# Patient Record
Sex: FEMALE | Race: BLACK OR AFRICAN AMERICAN | HISPANIC OR LATINO | Employment: UNEMPLOYED | URBAN - METROPOLITAN AREA
[De-identification: names, ages, dates, MRNs, and addresses within clinical notes are randomized per-mention and may not be internally consistent; named-entity substitution may affect disease eponyms.]

---

## 2023-10-04 ENCOUNTER — ROUTINE PRENATAL (OUTPATIENT)
Dept: OBSTETRICS AND GYNECOLOGY | Facility: CLINIC | Age: 23
End: 2023-10-04
Payer: COMMERCIAL

## 2023-10-04 VITALS — SYSTOLIC BLOOD PRESSURE: 109 MMHG | DIASTOLIC BLOOD PRESSURE: 75 MMHG | BODY MASS INDEX: 24.07 KG/M2 | WEIGHT: 127.4 LBS

## 2023-10-04 DIAGNOSIS — Z3A.21 21 WEEKS GESTATION OF PREGNANCY: Primary | ICD-10-CM

## 2023-10-04 DIAGNOSIS — Z67.91 RH NEGATIVE STATUS DURING PREGNANCY IN SECOND TRIMESTER: ICD-10-CM

## 2023-10-04 DIAGNOSIS — O44.40 LOW-LYING PLACENTA: ICD-10-CM

## 2023-10-04 DIAGNOSIS — O26.892 RH NEGATIVE STATUS DURING PREGNANCY IN SECOND TRIMESTER: ICD-10-CM

## 2023-10-04 LAB
GLUCOSE UR STRIP-MCNC: NEGATIVE MG/DL
PROT UR STRIP-MCNC: NEGATIVE MG/DL

## 2023-11-02 ENCOUNTER — ROUTINE PRENATAL (OUTPATIENT)
Dept: OBSTETRICS AND GYNECOLOGY | Facility: CLINIC | Age: 23
End: 2023-11-02
Payer: COMMERCIAL

## 2023-11-02 VITALS — WEIGHT: 134.4 LBS | DIASTOLIC BLOOD PRESSURE: 60 MMHG | SYSTOLIC BLOOD PRESSURE: 106 MMHG | BODY MASS INDEX: 25.39 KG/M2

## 2023-11-02 DIAGNOSIS — N89.8 VAGINAL DISCHARGE: ICD-10-CM

## 2023-11-02 DIAGNOSIS — O26.899 RH NEGATIVE, ANTEPARTUM: ICD-10-CM

## 2023-11-02 DIAGNOSIS — Z67.91 RH NEGATIVE, ANTEPARTUM: ICD-10-CM

## 2023-11-02 DIAGNOSIS — B37.31 CANDIDAL VULVOVAGINITIS: ICD-10-CM

## 2023-11-02 DIAGNOSIS — Z11.3 SCREEN FOR SEXUALLY TRANSMITTED DISEASES: ICD-10-CM

## 2023-11-02 DIAGNOSIS — Z34.00 SUPERVISION OF NORMAL FIRST PREGNANCY, ANTEPARTUM: Primary | ICD-10-CM

## 2023-11-02 DIAGNOSIS — O44.40 LOW-LYING PLACENTA: ICD-10-CM

## 2023-11-02 NOTE — PROGRESS NOTES
Entirety of today's encounter including patient interview, exam, and counseling performed with the aid of a medical  via the telephone.     Chief complaint: Patient here for routine OB visit.    History of present illness:   Patient reports some vulvovaginal irritation.  She also ports the presence of a whitish discharge.  She says her partner has also noticed similar symptoms.  She denies dysuria.  He also denies dysuria.  Patient reports that about 2 weeks ago there have been a 2-day span where she did not feel the baby move.  She says the baby is moving normally now.  No major complaints at this time.  She denies contractions, vaginal bleeding, leakage of fluid.  She reports active fetal movement.    Objective: See vital signs in the flowsheet  General: No acute distress, awake and oriented x3  Abdomen: Soft, nontender, gravid, fetal heart tones 139  Extremities: No lower extremity edema, no calf tenderness  Psychiatric: Good judgment insight, normal affect and mood  Neurologic: Cranial nerves II through XII intact, no gross deficits    Ultrasound today:   Appropriate growth for gestational age, estimated fetal weight at the 52nd percentile, abdominal circumference at the 47th percentile  Normal amniotic fluid  There is a posterior placenta with no previa.  The placenta is no longer low-lying.  Gestational age-appropriate gross fetal movements are noted.    Assessment:  1.  23-year-old  1 at 25-6/7 weeks gestational age  2.  Low-lying placenta, now resolved  3.  Vaginal discharge, suspect vulvovaginal candidiasis  4.  Rh-    Plan:  1.  Ultrasound findings were discussed with the patient today.  Limitations of testing were explained.  2.  We discussed patient's concerns of her fetal movements.  The baby has been moving normally over the last 2 weeks.  We discussed fetal movement expectations and kick counts with the patient.  3.  Symptoms and exam findings c/w candida. Will treat  empirically with fluconazole. Also recommend probiotics. Avoid vaginal/vulvar irritants. Will send cultures for confirmation. Followup as needed.   4.  Return to the office in 2 weeks.  28-week labs at next visit.  RhoGAM at next visit.

## 2023-11-07 LAB
A VAGINAE DNA VAG QL NAA+PROBE: ABNORMAL SCORE
BVAB2 DNA VAG QL NAA+PROBE: ABNORMAL SCORE
C ALBICANS DNA VAG QL NAA+PROBE: POSITIVE
C GLABRATA DNA VAG QL NAA+PROBE: NEGATIVE
C TRACH DNA VAG QL NAA+PROBE: NEGATIVE
MEGA1 DNA VAG QL NAA+PROBE: ABNORMAL SCORE
N GONORRHOEA DNA VAG QL NAA+PROBE: NEGATIVE
T VAGINALIS DNA VAG QL NAA+PROBE: NEGATIVE

## 2023-11-13 DIAGNOSIS — Z34.00 SUPERVISION OF NORMAL FIRST PREGNANCY, ANTEPARTUM: ICD-10-CM

## 2023-11-13 DIAGNOSIS — O21.9 NAUSEA AND VOMITING DURING PREGNANCY: ICD-10-CM

## 2023-11-13 RX ORDER — DOXYLAMINE SUCCINATE AND PYRIDOXINE HYDROCHLORIDE 20; 20 MG/1; MG/1
1 TABLET, EXTENDED RELEASE ORAL 2 TIMES DAILY
Qty: 60 TABLET | Refills: 2 | Status: SHIPPED | OUTPATIENT
Start: 2023-11-13

## 2023-11-13 NOTE — TELEPHONE ENCOUNTER
Med refill. 27wk3d. OB 11/16/23. [facility], [facility]. Thank you.

## 2023-11-16 ENCOUNTER — ROUTINE PRENATAL (OUTPATIENT)
Dept: OBSTETRICS AND GYNECOLOGY | Facility: CLINIC | Age: 23
End: 2023-11-16
Payer: COMMERCIAL

## 2023-11-16 VITALS — BODY MASS INDEX: 25.81 KG/M2 | SYSTOLIC BLOOD PRESSURE: 109 MMHG | WEIGHT: 136.6 LBS | DIASTOLIC BLOOD PRESSURE: 60 MMHG

## 2023-11-16 DIAGNOSIS — Z34.00 SUPERVISION OF NORMAL FIRST PREGNANCY, ANTEPARTUM: ICD-10-CM

## 2023-11-16 DIAGNOSIS — O26.899 RH NEGATIVE, ANTEPARTUM: Primary | ICD-10-CM

## 2023-11-16 DIAGNOSIS — Z11.3 SCREEN FOR SEXUALLY TRANSMITTED DISEASES: ICD-10-CM

## 2023-11-16 DIAGNOSIS — Z67.91 RH NEGATIVE, ANTEPARTUM: Primary | ICD-10-CM

## 2023-11-16 DIAGNOSIS — Z23 NEED FOR TDAP VACCINATION: ICD-10-CM

## 2023-11-16 NOTE — PROGRESS NOTES
Entirety of today's encounter including patient interview, exam, and counseling performed with the aid of a medical  via the telephone.     Chief complaint: Patient here for routine OB visit.    History of present illness: No major complaints at this time.  She denies contractions, vaginal bleeding, leakage of fluid.  She reports active fetal movement.    Objective: See vital signs in the flowsheet  General: No acute distress, awake and oriented x3  Abdomen: Soft, nontender, gravid, fetal heart tones 135, fundal height 28 cm  Extremities: No lower extremity edema, no calf tenderness  Psychiatric: Good judgment insight, normal affect and mood  Neurologic: Cranial nerves II through XII intact, no gross deficits    Assessment:  1.  23-year-old  1 at 27 6/7 weeks gestational age noted to be Rh-    Plan:  1.  Routine 28-week labs today.  2.  RhoGAM and Tdap are recommended today.  Risk, benefits, alternatives, side effects discussed.  Patient agrees with plan for RhoGAM and Tdap today.  .  Return to office 2 weeks as  schedule.

## 2023-11-17 DIAGNOSIS — O99.019 MATERNAL ANEMIA IN PREGNANCY, ANTEPARTUM: Primary | ICD-10-CM

## 2023-11-17 LAB
BASOPHILS # BLD AUTO: 0 X10E3/UL (ref 0–0.2)
BASOPHILS NFR BLD AUTO: 0 %
EOSINOPHIL # BLD AUTO: 0.2 X10E3/UL (ref 0–0.4)
EOSINOPHIL NFR BLD AUTO: 2 %
ERYTHROCYTE [DISTWIDTH] IN BLOOD BY AUTOMATED COUNT: 12.2 % (ref 11.7–15.4)
FERRITIN SERPL-MCNC: 13 NG/ML (ref 15–150)
GLUCOSE 1H P 50 G GLC PO SERPL-MCNC: 110 MG/DL (ref 70–139)
HCT VFR BLD AUTO: 29.2 % (ref 34–46.6)
HGB BLD-MCNC: 10.1 G/DL (ref 11.1–15.9)
HIV 1+2 AB+HIV1 P24 AG SERPL QL IA: NON REACTIVE
IMM GRANULOCYTES # BLD AUTO: 0.2 X10E3/UL (ref 0–0.1)
IMM GRANULOCYTES NFR BLD AUTO: 2 %
LYMPHOCYTES # BLD AUTO: 1.9 X10E3/UL (ref 0.7–3.1)
LYMPHOCYTES NFR BLD AUTO: 18 %
MCH RBC QN AUTO: 31.9 PG (ref 26.6–33)
MCHC RBC AUTO-ENTMCNC: 34.6 G/DL (ref 31.5–35.7)
MCV RBC AUTO: 92 FL (ref 79–97)
MONOCYTES # BLD AUTO: 0.8 X10E3/UL (ref 0.1–0.9)
MONOCYTES NFR BLD AUTO: 8 %
NEUTROPHILS # BLD AUTO: 7.2 X10E3/UL (ref 1.4–7)
NEUTROPHILS NFR BLD AUTO: 70 %
PLATELET # BLD AUTO: 337 X10E3/UL (ref 150–450)
RBC # BLD AUTO: 3.17 X10E6/UL (ref 3.77–5.28)
RPR SER QL: NON REACTIVE
WBC # BLD AUTO: 10.4 X10E3/UL (ref 3.4–10.8)

## 2023-11-17 RX ORDER — FERROUS SULFATE 325(65) MG
325 TABLET ORAL EVERY OTHER DAY
Qty: 15 TABLET | Refills: 5 | Status: SHIPPED | OUTPATIENT
Start: 2023-11-17

## 2023-11-22 ENCOUNTER — TELEPHONE (OUTPATIENT)
Dept: OBSTETRICS AND GYNECOLOGY | Facility: CLINIC | Age: 23
End: 2023-11-22
Payer: COMMERCIAL

## 2023-11-22 NOTE — TELEPHONE ENCOUNTER
----- Message from Silvia Corey sent at 11/22/2023 10:08 AM EST -----  Regarding: FW: Dylonlynette brar !!  Contact: [phone]        ----- Message -----  From: Rita Leary  Sent: 11/22/2023   6:54 AM EST  To: [facility]  Subject: Leyda nietos !!                                   Recogí mis pastillas para el chuy en el [facility], karo me recomendaron no tomarlas junto con las pastillas prenatales porque es demasiado chuy, de que forma las puedo victorina ?

## 2023-11-30 ENCOUNTER — ROUTINE PRENATAL (OUTPATIENT)
Dept: OBSTETRICS AND GYNECOLOGY | Facility: CLINIC | Age: 23
End: 2023-11-30
Payer: COMMERCIAL

## 2023-11-30 VITALS — DIASTOLIC BLOOD PRESSURE: 55 MMHG | WEIGHT: 142.8 LBS | BODY MASS INDEX: 26.98 KG/M2 | SYSTOLIC BLOOD PRESSURE: 108 MMHG

## 2023-11-30 DIAGNOSIS — O21.9 NAUSEA AND VOMITING DURING PREGNANCY: ICD-10-CM

## 2023-11-30 DIAGNOSIS — O99.019 MATERNAL ANEMIA IN PREGNANCY, ANTEPARTUM: ICD-10-CM

## 2023-11-30 DIAGNOSIS — Z34.00 SUPERVISION OF NORMAL FIRST PREGNANCY, ANTEPARTUM: Primary | ICD-10-CM

## 2023-11-30 RX ORDER — PANTOPRAZOLE SODIUM 40 MG/1
40 TABLET, DELAYED RELEASE ORAL DAILY
Qty: 30 TABLET | Refills: 5 | Status: SHIPPED | OUTPATIENT
Start: 2023-11-30 | End: 2023-12-01 | Stop reason: HOSPADM

## 2023-11-30 NOTE — PROGRESS NOTES
Entirety of today's encounter including patient interview, exam, and counseling performed with the aid of a medical  via the telephone.     Chief complaint: Patient here for routine OB visit.    History of present illness:   Patient is reporting diminished appetite, nausea, and abdominal pain.  She reports the abdominal pain is generally constant in nature.  She reports it is periumbilical and also generalized throughout the abdomen.  She reports it is a mild type pain.  She says she gets relief from the pain by eating.  She says she really does not have much appetite.  She is using Bonjesta, but has not tried anything else for nausea.  She denies constipation or diarrhea.  She says she no longer needs the docusate.  She denies frequent episodes of emesis.  She denies fevers and chills.  She is otherwise doing well.   She denies contractions, vaginal bleeding, leakage of fluid.  She reports active fetal movement.    Objective: See vital signs in the flowsheet  General: No acute distress, awake and oriented x3  Abdomen: Soft, nontender, gravid, fetal heart tones 155, fundal height 30 cm  Extremities: No lower extremity edema, no calf tenderness  Psychiatric: Good judgment insight, normal affect and mood  Neurologic: Cranial nerves II through XII intact, no gross deficits      Assessment:  1.  23-year-old  1 at 29 6/7 weeks gestational age  2.  Nausea of pregnancy  3.  Diminished appetite  4.  Anemia in pregnancy, asymptomatic    Plan:  1.  Patient is reporting some generalized abdominal pain that is resolved with eating.  She is also having nausea.  Her pain really does not sound consistent with contractions.  I do not think this is a sign of  labor.  Patient may have some element of GERD/gastritis.  I would recommend starting a daily proton pump inhibitor.  Despite her concerns about diminished appetite, she has had appropriate weight gain through this pregnancy.  Reassurance is  offered.  We discussed dietary and lifestyle modifications that may also help with the symptoms.  2.  Otherwise doing well.  Return to the office in 2 weeks.  Ultrasound in 3 weeks.

## 2023-12-01 ENCOUNTER — HOSPITAL ENCOUNTER (EMERGENCY)
Facility: HOSPITAL | Age: 23
Discharge: HOME | End: 2023-12-01
Attending: OBSTETRICS & GYNECOLOGY | Admitting: OBSTETRICS & GYNECOLOGY
Payer: COMMERCIAL

## 2023-12-01 VITALS
BODY MASS INDEX: 25.2 KG/M2 | DIASTOLIC BLOOD PRESSURE: 44 MMHG | SYSTOLIC BLOOD PRESSURE: 96 MMHG | RESPIRATION RATE: 16 BRPM | HEIGHT: 63 IN | HEART RATE: 89 BPM | TEMPERATURE: 97.9 F | WEIGHT: 142.2 LBS

## 2023-12-01 LAB
BACTERIA UR QL AUTO: NORMAL /HPF
BASOPHILS # BLD AUTO: 0.04 10*3/MM3 (ref 0–0.2)
BASOPHILS NFR BLD AUTO: 0.3 % (ref 0–1.5)
BILIRUB UR QL STRIP: NEGATIVE
CLARITY UR: CLEAR
COLOR UR: YELLOW
DEPRECATED RDW RBC AUTO: 44.6 FL (ref 37–54)
EOSINOPHIL # BLD AUTO: 0.3 10*3/MM3 (ref 0–0.4)
EOSINOPHIL NFR BLD AUTO: 2.2 % (ref 0.3–6.2)
ERYTHROCYTE [DISTWIDTH] IN BLOOD BY AUTOMATED COUNT: 13 % (ref 12.3–15.4)
FETAL RBC/RBC BLD FC-RTO: 0 %
GLUCOSE UR STRIP-MCNC: NEGATIVE MG/DL
HCT VFR BLD AUTO: 26.4 % (ref 34–46.6)
HGB BLD-MCNC: 9.2 G/DL (ref 12–15.9)
HGB F BLD QL KLEIH BETKE: NORMAL
HGB UR QL STRIP.AUTO: ABNORMAL
HYALINE CASTS UR QL AUTO: NORMAL /LPF
IMM GRANULOCYTES # BLD AUTO: 0.51 10*3/MM3 (ref 0–0.05)
IMM GRANULOCYTES NFR BLD AUTO: 3.8 % (ref 0–0.5)
KETONES UR QL STRIP: NEGATIVE
LEUKOCYTE ESTERASE UR QL STRIP.AUTO: NEGATIVE
LYMPHOCYTES # BLD AUTO: 2.47 10*3/MM3 (ref 0.7–3.1)
LYMPHOCYTES NFR BLD AUTO: 18.5 % (ref 19.6–45.3)
MCH RBC QN AUTO: 32.6 PG (ref 26.6–33)
MCHC RBC AUTO-ENTMCNC: 34.8 G/DL (ref 31.5–35.7)
MCV RBC AUTO: 93.6 FL (ref 79–97)
MONOCYTES # BLD AUTO: 1.2 10*3/MM3 (ref 0.1–0.9)
MONOCYTES NFR BLD AUTO: 9 % (ref 5–12)
NEUTROPHILS NFR BLD AUTO: 66.2 % (ref 42.7–76)
NEUTROPHILS NFR BLD AUTO: 8.82 10*3/MM3 (ref 1.7–7)
NITRITE UR QL STRIP: NEGATIVE
NRBC BLD AUTO-RTO: 0 /100 WBC (ref 0–0.2)
PH UR STRIP.AUTO: 6 [PH] (ref 5–8)
PLATELET # BLD AUTO: 286 10*3/MM3 (ref 140–450)
PMV BLD AUTO: 9.5 FL (ref 6–12)
PROT UR QL STRIP: NEGATIVE
RBC # BLD AUTO: 2.82 10*6/MM3 (ref 3.77–5.28)
RBC # UR STRIP: NORMAL /HPF
REF LAB TEST METHOD: NORMAL
SP GR UR STRIP: 1.01 (ref 1–1.03)
SQUAMOUS #/AREA URNS HPF: NORMAL /HPF
UROBILINOGEN UR QL STRIP: ABNORMAL
WBC # UR STRIP: NORMAL /HPF
WBC NRBC COR # BLD AUTO: 13.34 10*3/MM3 (ref 3.4–10.8)

## 2023-12-01 PROCEDURE — 59025 FETAL NON-STRESS TEST: CPT

## 2023-12-01 PROCEDURE — 85025 COMPLETE CBC W/AUTO DIFF WBC: CPT | Performed by: OBSTETRICS & GYNECOLOGY

## 2023-12-01 PROCEDURE — 87086 URINE CULTURE/COLONY COUNT: CPT | Performed by: OBSTETRICS & GYNECOLOGY

## 2023-12-01 PROCEDURE — 99284 EMERGENCY DEPT VISIT MOD MDM: CPT | Performed by: OBSTETRICS & GYNECOLOGY

## 2023-12-01 PROCEDURE — 85460 HEMOGLOBIN FETAL: CPT | Performed by: OBSTETRICS & GYNECOLOGY

## 2023-12-01 PROCEDURE — 81001 URINALYSIS AUTO W/SCOPE: CPT | Performed by: OBSTETRICS & GYNECOLOGY

## 2023-12-01 NOTE — OBED NOTES
[facility] Note OB        Patient Name: Riat Rivera  YOB: 2000  MRN: [id number]  Admission Date: 2023  2:32 AM  Date of Service: 2023    Chief Complaint: Vaginal Bleeding (MK - VB that started around 0150 after sexual intercourse, only notices VB when wiping. +FM. Denies LOF. )        Subjective     Rita Rivera is a 23 y.o. female  at 30w0d with Estimated Date of Delivery: 24 who presents with the chief complaint listed above.  She sees Rock Etienne for her prenatal care. Her pregnancy has been complicated by: Does not speak English,  used.  Rh-, maternal anemia.  Patient reports to OB ED stating that she began noticing blood on the toilet paper following voiding after intercourse this evening about 1:50 AM.  She denies any loss of fluid otherwise.  She is not feeling contractions.  And she feels normal fetal movement              Objective   Patient Active Problem List    Diagnosis     Maternal anemia in pregnancy, antepartum [O99.019]     Rh negative, antepartum [O26.899, Z67.91]     Supervision of normal first pregnancy, antepartum [Z34.00]         OB History    Para Term  AB Living   1 0 0 0 0 0   SAB IAB Ectopic Molar Multiple Live Births   0 0 0 0 0 0      # Outcome Date GA Lbr Tarik/2nd Weight Sex Delivery Anes PTL Lv   1 Current                 History reviewed. No pertinent past medical history.    History reviewed. No pertinent surgical history.    No current facility-administered medications on file prior to encounter.     Current Outpatient Medications on File Prior to Encounter   Medication Sig Dispense Refill    ferrous sulfate 325 (65 FE) MG tablet Take 1 tablet by mouth Every Other Day. 15 tablet 5    Prenatal Vit-Fe Fumarate-FA (prenatal vitamin 28-0.8) 28-0.8 MG tablet tablet Take 1 tablet by mouth Daily.      Bonjesta 20-20 MG tablet controlled-release tablet TAKE 1 TABLET BY  "MOUTH TWICE A DAY 60 tablet 2    pantoprazole (Protonix) 40 MG EC tablet Take 1 tablet by mouth Daily. 30 tablet 5       No Known Allergies    Family History   Problem Relation Age of Onset    Breast cancer Other     Ovarian cancer Neg Hx     Uterine cancer Neg Hx     Colon cancer Neg Hx        Social History     Socioeconomic History    Marital status: Single   Tobacco Use    Smoking status: Never   Vaping Use    Vaping Use: Former   Substance and Sexual Activity    Alcohol use: Not Currently     Comment: socially    Drug use: Never    Sexual activity: Yes     Partners: Male     Birth control/protection: None           Review of Systems   Constitutional:  Negative for chills, fatigue and fever.   HENT: Negative.     Eyes:  Negative for photophobia and visual disturbance.   Respiratory:  Negative for cough, chest tightness and shortness of breath.    Cardiovascular:  Negative for chest pain and leg swelling.   Gastrointestinal:  Negative for abdominal pain, diarrhea, nausea and vomiting.   Genitourinary:  Positive for vaginal bleeding (Blood seen when voiding since intercourse at 1:50 AM). Negative for dysuria, flank pain, hematuria, pelvic pain and vaginal discharge.   Musculoskeletal:  Negative for back pain.   Neurological:  Negative for dizziness, seizures, weakness and headaches.          PHYSICAL EXAM:      VITAL SIGNS:  Vitals:    12/01/23 0256 12/01/23 0301 12/01/23 0316 12/01/23 0331   BP: 102/53 112/54 105/52 96/44   BP Location: Left arm      Patient Position: Lying      Pulse: 98 98 93 89   Resp: 16      Temp: 97.9 °F (36.6 °C)      TempSrc: Oral      Weight:       Height: 160 cm (62.99\")           FHT'S:                   Baseline:  120  BPM  Variability:  Moderate = 6 - 25 BPM  Accelerations:  15 x 15 accelerations present     Decelerations:  absent  Contractions:   absent     Interpretation:   Reactive NST, CAT 1 tracing        PHYSICAL EXAM:    General: well developed; well nourished  no acute " distress   Heart: Not performed.   Lungs: breathing is unlabored     Abdomen: soft, non-tender; no masses  no umbilical or inguinal hernias are present       Cervix: No blood seen on exam      Contractions: none        Extremities: peripheral pulses normal, no pedal edema, no clubbing or cyanosis      LABS AND TESTING ORDERED:  Uterine and fetal monitoring  Urinalysis  CBC, KB    LAB RESULTS:    Recent Results (from the past 24 hour(s))   Urinalysis With Culture If Indicated - Urine, Clean Catch    Collection Time: 12/01/23  3:03 AM    Specimen: Urine, Clean Catch   Result Value Ref Range    Color, UA Yellow Yellow, Straw    Appearance, UA Clear Clear    pH, UA 6.0 5.0 - 8.0    Specific Gravity, UA 1.007 1.005 - 1.030    Glucose, UA Negative Negative    Ketones, UA Negative Negative    Bilirubin, UA Negative Negative    Blood, UA Small (1+) (A) Negative    Protein, UA Negative Negative    Leuk Esterase, UA Negative Negative    Nitrite, UA Negative Negative    Urobilinogen, UA 0.2 E.U./dL 0.2 - 1.0 E.U./dL   Urinalysis, Microscopic Only - Urine, Clean Catch    Collection Time: 12/01/23  3:03 AM    Specimen: Urine, Clean Catch   Result Value Ref Range    RBC, UA 0-2 None Seen, 0-2 /HPF    WBC, UA 0-2 None Seen, 0-2 /HPF    Bacteria, UA None Seen None Seen /HPF    Squamous Epithelial Cells, UA 0-2 None Seen, 0-2 /HPF    Hyaline Casts, UA None Seen None Seen /LPF    Methodology Automated Microscopy    Kleihauer-Betke Stain    Collection Time: 12/01/23  3:45 AM    Specimen: Blood   Result Value Ref Range    KB Stain Result  NO FETAL CELLS SEEN NO FETAL CELLS SEEN    % Fetal Cells 0.00 <=0.00 %   CBC Auto Differential    Collection Time: 12/01/23  3:45 AM    Specimen: Blood   Result Value Ref Range    WBC 13.34 (H) 3.40 - 10.80 10*3/mm3    RBC 2.82 (L) 3.77 - 5.28 10*6/mm3    Hemoglobin 9.2 (L) 12.0 - 15.9 g/dL    Hematocrit 26.4 (L) 34.0 - 46.6 %    MCV 93.6 79.0 - 97.0 fL    MCH 32.6 26.6 - 33.0 pg    MCHC 34.8 31.5 -  "35.7 g/dL    RDW 13.0 12.3 - 15.4 %    RDW-SD 44.6 37.0 - 54.0 fl    MPV 9.5 6.0 - 12.0 fL    Platelets 286 140 - 450 10*3/mm3    Neutrophil % 66.2 42.7 - 76.0 %    Lymphocyte % 18.5 (L) 19.6 - 45.3 %    Monocyte % 9.0 5.0 - 12.0 %    Eosinophil % 2.2 0.3 - 6.2 %    Basophil % 0.3 0.0 - 1.5 %    Immature Grans % 3.8 (H) 0.0 - 0.5 %    Neutrophils, Absolute 8.82 (H) 1.70 - 7.00 10*3/mm3    Lymphocytes, Absolute 2.47 0.70 - 3.10 10*3/mm3    Monocytes, Absolute 1.20 (H) 0.10 - 0.90 10*3/mm3    Eosinophils, Absolute 0.30 0.00 - 0.40 10*3/mm3    Basophils, Absolute 0.04 0.00 - 0.20 10*3/mm3    Immature Grans, Absolute 0.51 (H) 0.00 - 0.05 10*3/mm3    nRBC 0.0 0.0 - 0.2 /100 WBC       Lab Results   Component Value Date    ABO A 2023    RH Negative 2023       No results found for: \"STREPGPB\"              Assessment & Plan   ASSESSMENT/PLAN:  Rita Rivera is a 23 y.o. female  at 30w0d .   Her pregnancy has been complicated by: Does not speak English,  used.  Rh-, maternal anemia.  Patient reports to OB ED stating that she began noticing blood on the toilet paper following voiding after intercourse this evening about 1:50 AM..  Patient was seen and evaluated.  She was noted to have no further bleeding on exam in view of being Rh- a KUB was obtained which returned negative with no fetal cells seen.  A CBC was obtained demonstrating continued anemia with an H&H of 9.2 and 26.4, her platelets remain normal at 286,000.  UA with no evidence of UTI or hematuria.  Fetus noted to have reactive NST and category 1 tracing.  Findings are most consistent with spotting following intercourse.  She is Rh- but with a negative KUB she does not require RhoGAM at this time.  In view of no evidence for continued bleeding, reassuring fetal status and no evidence of premature labor the patient will be discharged home and will follow-up with the office as scheduled    Final " "Impression:  Pregnancy at 30w0d    Reactive NST, CAT   1 tracing, Reassuring fetal status  Patient noted to have continued anemia with H&H of 9.2 and 28.4, she will continue her home meds which include iron.  Spotting just seen with voiding no evidence for urinary tract infection or for continued vaginal bleeding and no evidence for  labor, patient will follow-up with office as required  UA with no evidence of UTI, hematuria, or proteinuria.    Maternal vital signs were reviewed and were unremarkable                   Vitals:    23 0256 23 0301 23 0316 23 0331   BP: 102/53 112/54 105/52 96/44   BP Location: Left arm      Patient Position: Lying      Pulse: 98 98 93 89   Resp: 16      Temp: 97.9 °F (36.6 °C)      TempSrc: Oral      Weight:       Height: 160 cm (62.99\")            She will be discharged to home     PLAN:  Discharge to home  She will continue her home meds however she will  discontinue Bonjesta       Your medication list        ASK your doctor about these medications        Instructions Last Dose Given Next Dose Due   Bonjesta 20-20 MG tablet controlled-release tablet  Generic drug: doxylamine-pyridoxine ER      Landen 1 tableta por via oral dos veces al dai  (TAKE 1 TABLET BY MOUTH TWICE A DAY)       ferrous sulfate 325 (65 FE) MG tablet      Landen kevin tableta cada otro dai  (Take 1 tablet by mouth Every Other Day.)       pantoprazole 40 MG EC tablet  Commonly known as: Protonix      Landen 1 tableta por via oral diariamente.  (Take 1 tablet by mouth Daily.)       prenatal vitamin 28-0.8 28-0.8 MG tablet tablet      Landen 1 tableta por via oral diariamente.  (Take 1 tablet by mouth Daily.)                  She will follow up with [facility] Etienne* as scheduled and as needed  She has been instructed to return for contractions, vaginal bleeding, Rupture of membranes, decreased fetal movement or other concern  She may call the office or MK with questions.     "       I have spent 30 minutes including face to face time with the patient, greater than 50% in discussion of the diagnosis (counseling) and/or coordination of care.         Jeo Cortes MD  12/1/2023  05:41 EST  OB Hospitalist  Phone:  [phone]

## 2023-12-02 LAB — BACTERIA SPEC AEROBE CULT: NO GROWTH

## 2023-12-14 ENCOUNTER — ROUTINE PRENATAL (OUTPATIENT)
Dept: OBSTETRICS AND GYNECOLOGY | Facility: CLINIC | Age: 23
End: 2023-12-14
Payer: COMMERCIAL

## 2023-12-14 VITALS — SYSTOLIC BLOOD PRESSURE: 115 MMHG | BODY MASS INDEX: 25.16 KG/M2 | DIASTOLIC BLOOD PRESSURE: 64 MMHG | WEIGHT: 142 LBS

## 2023-12-14 DIAGNOSIS — O99.019 MATERNAL ANEMIA IN PREGNANCY, ANTEPARTUM: ICD-10-CM

## 2023-12-14 DIAGNOSIS — O26.899 RH NEGATIVE, ANTEPARTUM: ICD-10-CM

## 2023-12-14 DIAGNOSIS — Z67.91 RH NEGATIVE, ANTEPARTUM: ICD-10-CM

## 2023-12-14 DIAGNOSIS — Z34.00 SUPERVISION OF NORMAL FIRST PREGNANCY, ANTEPARTUM: Primary | ICD-10-CM

## 2023-12-14 NOTE — PROGRESS NOTES
Entirety of today's encounter including patient interview, exam, and counseling performed with the aid of a medical  via the telephone.     Chief complaint: Patient here for routine OB visit.    History of present illness:   Patient reports fatigue throughout the day.  She also did have brief episodes of mild vaginal bleeding about 1-2 weeks ago.  She went for evaluation at labor and delivery and workup was unremarkable.  She has not had any further episodes since that time.  She reports very strong fetal kicks, sometimes has a painful flare.  She is otherwise without major complaints  She denies contractions, vaginal bleeding, leakage of fluid.  She reports active fetal movement.    Objective: See vital signs in the flowsheet  General: No acute distress, awake and oriented x3  Abdomen: Soft, nontender, gravid, fetal heart tones 145, fundal height 31 cm  Extremities: No lower extremity edema, no calf tenderness  Psychiatric: Good judgment insight, normal affect and mood  Neurologic: Cranial nerves II through XII intact, no gross deficits    Assessment:  1.  23-year-old  1 at 31-6/10 weeks gestational age  2.  Rh-, status post RhoGAM  3.  Anemia of pregnancy    Plan:  1.  Reassurance offered to the patient that fatigue in the third trimester is very common.  Office related disruptions of sleep pattern.  2.  Patient is encouraged to continue oral iron.  3 return to office in 1 week.  Ultrasound for growth next week.

## 2023-12-21 ENCOUNTER — ROUTINE PRENATAL (OUTPATIENT)
Dept: OBSTETRICS AND GYNECOLOGY | Facility: CLINIC | Age: 23
End: 2023-12-21
Payer: COMMERCIAL

## 2023-12-21 VITALS — SYSTOLIC BLOOD PRESSURE: 105 MMHG | BODY MASS INDEX: 24.98 KG/M2 | WEIGHT: 141 LBS | DIASTOLIC BLOOD PRESSURE: 58 MMHG

## 2023-12-21 DIAGNOSIS — Z67.91 RH NEGATIVE, ANTEPARTUM: ICD-10-CM

## 2023-12-21 DIAGNOSIS — O99.019 MATERNAL ANEMIA IN PREGNANCY, ANTEPARTUM: ICD-10-CM

## 2023-12-21 DIAGNOSIS — O26.899 RH NEGATIVE, ANTEPARTUM: ICD-10-CM

## 2023-12-21 DIAGNOSIS — Z34.00 SUPERVISION OF NORMAL FIRST PREGNANCY, ANTEPARTUM: Primary | ICD-10-CM

## 2023-12-21 NOTE — PROGRESS NOTES
Entirety of today's encounter including patient interview, exam, and counseling performed with the aid of a medical  via the telephone.     Chief complaint: Patient here for routine OB visit.    History of present illness: No major complaints at this time.  She denies contractions, vaginal bleeding, leakage of fluid.  She reports active fetal movement.    Patient had questions about route of delivery, whether vaginal or  will be indicated.    Objective: See vital signs in the flowsheet  General: No acute distress, awake and oriented x3  Abdomen: Soft, nontender, gravid, fetal heart tones 132 bpm, fundal height 32 cm  Extremities: No lower extremity edema, no calf tenderness  Psychiatric: Good judgment insight, normal affect and mood  Neurologic: Cranial nerves II through XII intact, no gross deficits    Ultrasound today:   Appropriate growth for gestational age with the estimated fetal weight at the 40th percentile.  Abdominal circumference is at the 41st percentile.  Normal amniotic fluid  Cephalic presentation    Assessment:  1.  23-year-old  1 at 32-6/7 weeks gestational age  2.  Anemia of pregnancy, asymptomatic    Plan:  1.  Ultrasound findings from today were discussed with the patient.  Limitations of ultrasound were explained.  2.  Patient had questions about route of delivery.  Explained that currently there are no obvious indications for  section.  Explained that overall the best outcomes for both mom and baby are with a vaginal delivery.  Explained that I will be helpful with the patient will be able to have healthy uncomplicated vaginal delivery.  We explained the typical indications for  section such as reach presentation, arrest of labor, nonreassuring fetal heart tones in labor, etc.  She verbalized understanding and agrees with plans for trial of labor.

## 2024-01-12 ENCOUNTER — ROUTINE PRENATAL (OUTPATIENT)
Dept: OBSTETRICS AND GYNECOLOGY | Facility: CLINIC | Age: 24
End: 2024-01-12
Payer: COMMERCIAL

## 2024-01-12 VITALS — DIASTOLIC BLOOD PRESSURE: 59 MMHG | BODY MASS INDEX: 26.05 KG/M2 | WEIGHT: 147 LBS | SYSTOLIC BLOOD PRESSURE: 105 MMHG

## 2024-01-12 DIAGNOSIS — O99.019 MATERNAL ANEMIA IN PREGNANCY, ANTEPARTUM: ICD-10-CM

## 2024-01-12 DIAGNOSIS — O26.899 RH NEGATIVE, ANTEPARTUM: ICD-10-CM

## 2024-01-12 DIAGNOSIS — Z67.91 RH NEGATIVE, ANTEPARTUM: ICD-10-CM

## 2024-01-12 DIAGNOSIS — Z34.00 SUPERVISION OF NORMAL FIRST PREGNANCY, ANTEPARTUM: Primary | ICD-10-CM

## 2024-01-12 RX ORDER — PANTOPRAZOLE SODIUM 40 MG/1
1 TABLET, DELAYED RELEASE ORAL DAILY
COMMUNITY
Start: 2023-12-27

## 2024-01-12 NOTE — PROGRESS NOTES
Entirety of today's encounter including patient interview, exam, and counseling performed with the aid of a medical  via the telephone.     Chief complaint: Prenatal visit  History of present illness: Patient is here for her routine prenatal visit.  She is without major complaints at this time.  She denies vaginal bleeding, leakage of fluid.  She reports active fetal movement.  She does report some occasional mild contractions, but nothing regular.    Objective: See vital signs in the flowsheet  General: No acute distress, awake and oriented x3  Abdomen: Soft, nontender, gravid, fetal heart tones 140, fundal height 36 cm  Pelvic exam performed in the presence of a female chaperone.  Patient provided verbal consent to proceed with exam today.  Normal external female genitalia, no lesions  No vaginal discharge or bleeding  Cervix: Fingertip/20% effaced/-2; cephalic   Extremities: No lower extremity edema, no calf tenderness    Assessment:  1.  23-year-old  1 at 36-0/7 weeks gestational age  2.  Rh-    Plan:  1.  Labor signs discussed with the patient at length.  Also explained the patient to go to [facility] if she has the need to go to the hospital.  Address and directions to the hospital provided.  2.  GBS performed today.  3.  Return to the office in 1 week.

## 2024-01-14 LAB — GP B STREP DNA SPEC QL NAA+PROBE: POSITIVE

## 2024-01-17 ENCOUNTER — ROUTINE PRENATAL (OUTPATIENT)
Dept: OBSTETRICS AND GYNECOLOGY | Facility: CLINIC | Age: 24
End: 2024-01-17
Payer: COMMERCIAL

## 2024-01-17 VITALS — WEIGHT: 146 LBS | SYSTOLIC BLOOD PRESSURE: 109 MMHG | BODY MASS INDEX: 25.87 KG/M2 | DIASTOLIC BLOOD PRESSURE: 64 MMHG

## 2024-01-17 DIAGNOSIS — Z67.91 RH NEGATIVE, ANTEPARTUM: ICD-10-CM

## 2024-01-17 DIAGNOSIS — Z3A.36 36 WEEKS GESTATION OF PREGNANCY: Primary | ICD-10-CM

## 2024-01-17 DIAGNOSIS — O99.019 MATERNAL ANEMIA IN PREGNANCY, ANTEPARTUM: ICD-10-CM

## 2024-01-17 DIAGNOSIS — Z34.93 PRENATAL CARE IN THIRD TRIMESTER: ICD-10-CM

## 2024-01-17 DIAGNOSIS — O26.899 RH NEGATIVE, ANTEPARTUM: ICD-10-CM

## 2024-01-17 DIAGNOSIS — B95.1 POSITIVE GBS TEST: ICD-10-CM

## 2024-01-17 LAB
CLINDAMYCIN ISLT KB: NORMAL
GP B STREP DNA SPEC QL NAA+PROBE: POSITIVE
ORGANISM ID: NORMAL

## 2024-01-17 NOTE — PROGRESS NOTES
Chief Complaint   Patient presents with    Routine Prenatal Visit       OB follow up     Rita Rivera is a 23 y.o.  36w5d being seen today for her obstetrical visit.  Patient reports occasional contractions. Fetal movement: present, but decreased from normal for her. She was unable to leave a urine sample today.     Visit completed via     Review of Systems  Cramping/contractions: c/o cramping and contractions intermittently since Tuesday  Vaginal bleeding: denies  Fetal movement: present    /64   Wt 66.2 kg (146 lb)   LMP 2023   BMI 25.87 kg/m²     Assessment/Plan    Diagnoses and all orders for this visit:    1. 36 weeks gestation of pregnancy (Primary)    2. Prenatal care in third trimester    3. Positive GBS test    4. Rh negative, antepartum    5. Maternal anemia in pregnancy, antepartum       Reviewed fetal kick counts. Advised fetal movement counts BID after meals. Gross FM noted visualized on exam today  Reviewed S&S labor  GBS positive, planning antibiotics in labor  Rh negative: s/p rhogam 23  BP normal range  SVE FT/thick/high  Maternal anemia:Continue iron supplements  Reviewed this stage of pregnancy  Problem list updated     Follow up in 1 week(s) with Dr. Etienne    I spent 30 minutes caring for Rita on this date of service. This time includes time spent by me in the following activities: preparing for the visit, reviewing tests, obtaining and/or reviewing a separately obtained history, performing a medically appropriate examination and/or evaluation, counseling and educating the patient/family/caregiver, ordering medications, tests, or procedures, referring and communicating with other health care professionals, and documenting information in the medical record    Princess Lux, APRN  2024  11:59 EST

## 2024-01-26 ENCOUNTER — ROUTINE PRENATAL (OUTPATIENT)
Dept: OBSTETRICS AND GYNECOLOGY | Facility: CLINIC | Age: 24
End: 2024-01-26
Payer: COMMERCIAL

## 2024-01-26 VITALS — BODY MASS INDEX: 26.22 KG/M2 | DIASTOLIC BLOOD PRESSURE: 70 MMHG | WEIGHT: 148 LBS | SYSTOLIC BLOOD PRESSURE: 113 MMHG

## 2024-01-26 DIAGNOSIS — O99.019 MATERNAL ANEMIA IN PREGNANCY, ANTEPARTUM: ICD-10-CM

## 2024-01-26 DIAGNOSIS — Z34.00 SUPERVISION OF NORMAL FIRST PREGNANCY, ANTEPARTUM: Primary | ICD-10-CM

## 2024-01-26 DIAGNOSIS — O26.899 RH NEGATIVE, ANTEPARTUM: ICD-10-CM

## 2024-01-26 DIAGNOSIS — Z67.91 RH NEGATIVE, ANTEPARTUM: ICD-10-CM

## 2024-01-26 NOTE — PROGRESS NOTES
Entirety of today's encounter including patient interview, exam, and counseling performed with the aid of a medical  via the telephone.     Chief complaint: Prenatal visit  History of present illness: Patient is here for her routine prenatal visit.  She is without major complaints at this time.  She denies vaginal bleeding, leakage of fluid.  She reports active fetal movement.  She does report some occasional mild contractions, but nothing regular.  Patient states that she feels that the baby is very big.  She is interested in labor induction when possible.    Objective: See vital signs in the flowsheet  General: No acute distress, awake and oriented x3  Abdomen: Soft, nontender, gravid, fetal heart tones 140, fundal height 36 cm  Pelvic exam performed in the presence of a female chaperone.  Patient provided verbal consent to proceed with exam today.  Normal external female genitalia, no lesions  No vaginal discharge or bleeding  Cervix: Fingertip dilated/50% effaced/-3   Unable to ascertain presenting part on exam.  Extremities: No lower extremity edema, no calf tenderness    Labs: GBS positive    Ultrasound today:  Findings:  Live single intrauterine pregnancy in cephalic presentation  Fetal heart tones 148 bpm  Posterior placenta    Amniotic fluid index:  Quadrant 1: 4.03 cm  Quadrant 2: 1.90 cm  Quadrant 3: 4.82 cm  Quadrant 4: 3.55 cm  Total: 14.3 cm    Impression:  Cephalic presentation    Assessment:  1.  23-year-old  1 at 38-0/7 weeks gestational age  2.  Rh-  3.  GBS positive    Plan:  1.  Labor signs discussed with the patient at length.  Also explained the patient to go to [facility] if she has the need to go to the hospital.  Address and directions to the hospital provided.  2.  Was unable to discern presentation on exam.  Ultrasound today confirms cephalic presentation  3.  We have discussed with patient the options for expectant management with allowing spontaneous labor  versus labor induction at 39 weeks and beyond.  Discussed the findings of the arrive trial.  Patient strongly desires labor induction when possible.  Explained in the absence of medical indications labor induction was wait until 39 weeks gestation.  She verbalized understanding.  Patient would like to proceed with labor induction at 39 weeks of gestation.  Induction is scheduled for Sunday 2/4 at 5 PM.  Instructions given to patient but will discuss further at the time of the next visit.  4.  Return to the office in 1 week.

## 2024-01-26 NOTE — LETTER
24    SCHEDULING FORM  C-SECTIONS/INDUCTIONS    Patient:  Rita Rivera  :  2000    Phone: [phone] (home)     OB provider:  Rock Etienne MD    Summary:  23 y.o. female     Type of Delivery:  Induction   Priority:  Elective    Desired Date:        Time:      Dating   Estimated Date of Delivery: 24    Gestational age at Desired date of Induction or CS: 39 weeks 2 days  ----------------------------------------------------------------------------  By ACOG Guidelines, women should be 39 weeks or greater before initiating an elective induction (non-medically indicated) delivery     Maternal Indications:        Fetal/Placental Conditions:      ----------------------------------------------------------------------------    For patients less than 39 weeks with indications not included in the above list, [facility] consult is required prior to scheduling.    [facility] Approved indication:       Date of consult:      I attest that the above entries are accurate to the best of my knowledge:    Rock Etienne MD  2024  11:13 EST

## 2024-02-02 ENCOUNTER — PREP FOR SURGERY (OUTPATIENT)
Dept: OTHER | Facility: HOSPITAL | Age: 24
End: 2024-02-02
Payer: COMMERCIAL

## 2024-02-02 ENCOUNTER — ROUTINE PRENATAL (OUTPATIENT)
Dept: OBSTETRICS AND GYNECOLOGY | Facility: CLINIC | Age: 24
End: 2024-02-02
Payer: COMMERCIAL

## 2024-02-02 VITALS — WEIGHT: 148 LBS | DIASTOLIC BLOOD PRESSURE: 63 MMHG | SYSTOLIC BLOOD PRESSURE: 113 MMHG | BODY MASS INDEX: 26.22 KG/M2

## 2024-02-02 DIAGNOSIS — Z67.91 RH NEGATIVE, ANTEPARTUM: ICD-10-CM

## 2024-02-02 DIAGNOSIS — O26.899 RH NEGATIVE, ANTEPARTUM: ICD-10-CM

## 2024-02-02 DIAGNOSIS — O99.019 MATERNAL ANEMIA IN PREGNANCY, ANTEPARTUM: ICD-10-CM

## 2024-02-02 DIAGNOSIS — Z34.03 ENCOUNTER FOR SUPERVISION OF NORMAL FIRST PREGNANCY IN THIRD TRIMESTER: Primary | ICD-10-CM

## 2024-02-02 DIAGNOSIS — Z34.00 SUPERVISION OF NORMAL FIRST PREGNANCY, ANTEPARTUM: Primary | ICD-10-CM

## 2024-02-02 RX ORDER — SODIUM CHLORIDE 0.9 % (FLUSH) 0.9 %
10 SYRINGE (ML) INJECTION EVERY 12 HOURS SCHEDULED
Status: CANCELLED | OUTPATIENT
Start: 2024-02-02

## 2024-02-02 RX ORDER — OXYTOCIN/0.9 % SODIUM CHLORIDE 30/500 ML
250 PLASTIC BAG, INJECTION (ML) INTRAVENOUS CONTINUOUS
Status: CANCELLED | OUTPATIENT
Start: 2024-02-02 | End: 2024-02-02

## 2024-02-02 RX ORDER — LIDOCAINE HYDROCHLORIDE 10 MG/ML
0.5 INJECTION, SOLUTION INFILTRATION; PERINEURAL ONCE AS NEEDED
Status: CANCELLED | OUTPATIENT
Start: 2024-02-02

## 2024-02-02 RX ORDER — TRANEXAMIC ACID 10 MG/ML
1000 INJECTION, SOLUTION INTRAVENOUS ONCE AS NEEDED
OUTPATIENT
Start: 2024-02-02

## 2024-02-02 RX ORDER — ONDANSETRON 4 MG/1
4 TABLET, ORALLY DISINTEGRATING ORAL EVERY 6 HOURS PRN
Status: CANCELLED | OUTPATIENT
Start: 2024-02-02

## 2024-02-02 RX ORDER — SODIUM CHLORIDE 0.9 % (FLUSH) 0.9 %
10 SYRINGE (ML) INJECTION AS NEEDED
Status: CANCELLED | OUTPATIENT
Start: 2024-02-02

## 2024-02-02 RX ORDER — MORPHINE SULFATE 2 MG/ML
2 INJECTION, SOLUTION INTRAMUSCULAR; INTRAVENOUS EVERY 4 HOURS PRN
Status: CANCELLED | OUTPATIENT
Start: 2024-02-02 | End: 2024-02-07

## 2024-02-02 RX ORDER — OXYTOCIN/0.9 % SODIUM CHLORIDE 30/500 ML
999 PLASTIC BAG, INJECTION (ML) INTRAVENOUS ONCE
Status: CANCELLED | OUTPATIENT
Start: 2024-02-02 | End: 2024-02-02

## 2024-02-02 RX ORDER — MISOPROSTOL 100 MCG
25 TABLET ORAL
Status: CANCELLED | OUTPATIENT
Start: 2024-02-02 | End: 2024-02-02

## 2024-02-02 RX ORDER — PENICILLIN G 3000000 [IU]/50ML
3 INJECTION, SOLUTION INTRAVENOUS EVERY 4 HOURS
Status: CANCELLED | OUTPATIENT
Start: 2024-02-02 | End: 2024-02-05

## 2024-02-02 RX ORDER — FAMOTIDINE 20 MG/1
20 TABLET, FILM COATED ORAL EVERY 12 HOURS PRN
Status: CANCELLED | OUTPATIENT
Start: 2024-02-02

## 2024-02-02 RX ORDER — ONDANSETRON 2 MG/ML
4 INJECTION INTRAMUSCULAR; INTRAVENOUS EVERY 6 HOURS PRN
Status: CANCELLED | OUTPATIENT
Start: 2024-02-02

## 2024-02-02 RX ORDER — MAGNESIUM CARB/ALUMINUM HYDROX 105-160MG
30 TABLET,CHEWABLE ORAL ONCE
Status: CANCELLED | OUTPATIENT
Start: 2024-02-02 | End: 2024-02-02

## 2024-02-02 RX ORDER — CARBOPROST TROMETHAMINE 250 UG/ML
250 INJECTION, SOLUTION INTRAMUSCULAR AS NEEDED
Status: CANCELLED | OUTPATIENT
Start: 2024-02-02

## 2024-02-02 RX ORDER — ACETAMINOPHEN 325 MG/1
650 TABLET ORAL EVERY 4 HOURS PRN
Status: CANCELLED | OUTPATIENT
Start: 2024-02-02

## 2024-02-02 RX ORDER — TERBUTALINE SULFATE 1 MG/ML
0.25 INJECTION, SOLUTION SUBCUTANEOUS AS NEEDED
Status: CANCELLED | OUTPATIENT
Start: 2024-02-02

## 2024-02-02 RX ORDER — FAMOTIDINE 10 MG/ML
20 INJECTION, SOLUTION INTRAVENOUS EVERY 12 HOURS PRN
Status: CANCELLED | OUTPATIENT
Start: 2024-02-02

## 2024-02-02 RX ORDER — NALOXONE HCL 0.4 MG/ML
0.4 VIAL (ML) INJECTION
Status: CANCELLED | OUTPATIENT
Start: 2024-02-02

## 2024-02-02 RX ORDER — MISOPROSTOL 200 UG/1
800 TABLET ORAL AS NEEDED
Status: CANCELLED | OUTPATIENT
Start: 2024-02-02

## 2024-02-02 RX ORDER — METHYLERGONOVINE MALEATE 0.2 MG/ML
200 INJECTION INTRAVENOUS ONCE AS NEEDED
Status: CANCELLED | OUTPATIENT
Start: 2024-02-02

## 2024-02-02 RX ORDER — SODIUM CHLORIDE, SODIUM LACTATE, POTASSIUM CHLORIDE, CALCIUM CHLORIDE 600; 310; 30; 20 MG/100ML; MG/100ML; MG/100ML; MG/100ML
125 INJECTION, SOLUTION INTRAVENOUS CONTINUOUS
Status: CANCELLED | OUTPATIENT
Start: 2024-02-02

## 2024-02-04 ENCOUNTER — ANESTHESIA (OUTPATIENT)
Dept: LABOR AND DELIVERY | Facility: HOSPITAL | Age: 24
End: 2024-02-04
Payer: COMMERCIAL

## 2024-02-04 ENCOUNTER — ANESTHESIA EVENT (OUTPATIENT)
Dept: LABOR AND DELIVERY | Facility: HOSPITAL | Age: 24
End: 2024-02-04
Payer: COMMERCIAL

## 2024-02-04 ENCOUNTER — HOSPITAL ENCOUNTER (INPATIENT)
Facility: HOSPITAL | Age: 24
LOS: 3 days | Discharge: HOME | End: 2024-02-07
Attending: OBSTETRICS & GYNECOLOGY | Admitting: OBSTETRICS & GYNECOLOGY
Payer: COMMERCIAL

## 2024-02-04 ENCOUNTER — HOSPITAL ENCOUNTER (OUTPATIENT)
Dept: LABOR AND DELIVERY | Facility: HOSPITAL | Age: 24
Discharge: HOME | End: 2024-02-04
Payer: COMMERCIAL

## 2024-02-04 DIAGNOSIS — Z34.03 ENCOUNTER FOR SUPERVISION OF NORMAL FIRST PREGNANCY IN THIRD TRIMESTER: ICD-10-CM

## 2024-02-04 LAB
ABO GROUP BLD: NORMAL
ALBUMIN SERPL-MCNC: 3.7 G/DL (ref 3.5–5.2)
ALBUMIN/GLOB SERPL: 1.1 G/DL
ALP SERPL-CCNC: 225 U/L (ref 39–117)
ALT SERPL W P-5'-P-CCNC: 20 U/L (ref 1–33)
ANION GAP SERPL CALCULATED.3IONS-SCNC: 12.6 MMOL/L (ref 5–15)
AST SERPL-CCNC: 21 U/L (ref 1–32)
BASOPHILS # BLD AUTO: 0.03 10*3/MM3 (ref 0–0.2)
BASOPHILS NFR BLD AUTO: 0.3 % (ref 0–1.5)
BILIRUB SERPL-MCNC: <0.2 MG/DL (ref 0–1.2)
BLD GP AB SCN SERPL QL: POSITIVE
BUN SERPL-MCNC: 6 MG/DL (ref 6–20)
BUN/CREAT SERPL: 13.3 (ref 7–25)
CALCIUM SPEC-SCNC: 8.7 MG/DL (ref 8.6–10.5)
CHLORIDE SERPL-SCNC: 103 MMOL/L (ref 98–107)
CO2 SERPL-SCNC: 19.4 MMOL/L (ref 22–29)
CREAT SERPL-MCNC: 0.45 MG/DL (ref 0.57–1)
DEPRECATED RDW RBC AUTO: 48.4 FL (ref 37–54)
EGFRCR SERPLBLD CKD-EPI 2021: 140.6 ML/MIN/1.73
EOSINOPHIL # BLD AUTO: 0.15 10*3/MM3 (ref 0–0.4)
EOSINOPHIL NFR BLD AUTO: 1.5 % (ref 0.3–6.2)
ERYTHROCYTE [DISTWIDTH] IN BLOOD BY AUTOMATED COUNT: 14.1 % (ref 12.3–15.4)
GLOBULIN UR ELPH-MCNC: 3.3 GM/DL
GLUCOSE SERPL-MCNC: 86 MG/DL (ref 65–99)
HCT VFR BLD AUTO: 33 % (ref 34–46.6)
HGB BLD-MCNC: 11.2 G/DL (ref 12–15.9)
IMM GRANULOCYTES # BLD AUTO: 0.18 10*3/MM3 (ref 0–0.05)
IMM GRANULOCYTES NFR BLD AUTO: 1.8 % (ref 0–0.5)
LYMPHOCYTES # BLD AUTO: 2.17 10*3/MM3 (ref 0.7–3.1)
LYMPHOCYTES NFR BLD AUTO: 21.9 % (ref 19.6–45.3)
MCH RBC QN AUTO: 31.5 PG (ref 26.6–33)
MCHC RBC AUTO-ENTMCNC: 33.9 G/DL (ref 31.5–35.7)
MCV RBC AUTO: 92.7 FL (ref 79–97)
MONOCYTES # BLD AUTO: 1.11 10*3/MM3 (ref 0.1–0.9)
MONOCYTES NFR BLD AUTO: 11.2 % (ref 5–12)
NEUTROPHILS NFR BLD AUTO: 6.25 10*3/MM3 (ref 1.7–7)
NEUTROPHILS NFR BLD AUTO: 63.3 % (ref 42.7–76)
NRBC BLD AUTO-RTO: 0 /100 WBC (ref 0–0.2)
PLATELET # BLD AUTO: 281 10*3/MM3 (ref 140–450)
PMV BLD AUTO: 10.8 FL (ref 6–12)
POTASSIUM SERPL-SCNC: 3.9 MMOL/L (ref 3.5–5.2)
PROT SERPL-MCNC: 7 G/DL (ref 6–8.5)
RBC # BLD AUTO: 3.56 10*6/MM3 (ref 3.77–5.28)
RESIDUAL RHIG DETECTED: NORMAL
RH BLD: NEGATIVE
SODIUM SERPL-SCNC: 135 MMOL/L (ref 136–145)
T PALLIDUM IGG SER QL: NORMAL
T&S EXPIRATION DATE: NORMAL
WBC NRBC COR # BLD AUTO: 9.89 10*3/MM3 (ref 3.4–10.8)

## 2024-02-04 PROCEDURE — 86870 RBC ANTIBODY IDENTIFICATION: CPT | Performed by: OBSTETRICS & GYNECOLOGY

## 2024-02-04 PROCEDURE — 86780 TREPONEMA PALLIDUM: CPT | Performed by: OBSTETRICS & GYNECOLOGY

## 2024-02-04 PROCEDURE — 86850 RBC ANTIBODY SCREEN: CPT | Performed by: OBSTETRICS & GYNECOLOGY

## 2024-02-04 PROCEDURE — 86901 BLOOD TYPING SEROLOGIC RH(D): CPT | Performed by: OBSTETRICS & GYNECOLOGY

## 2024-02-04 PROCEDURE — 80053 COMPREHEN METABOLIC PANEL: CPT | Performed by: OBSTETRICS & GYNECOLOGY

## 2024-02-04 PROCEDURE — 86900 BLOOD TYPING SEROLOGIC ABO: CPT | Performed by: OBSTETRICS & GYNECOLOGY

## 2024-02-04 PROCEDURE — 85025 COMPLETE CBC W/AUTO DIFF WBC: CPT | Performed by: OBSTETRICS & GYNECOLOGY

## 2024-02-04 RX ORDER — FAMOTIDINE 20 MG/1
20 TABLET, FILM COATED ORAL EVERY 12 HOURS PRN
Status: DISCONTINUED | OUTPATIENT
Start: 2024-02-04 | End: 2024-02-05 | Stop reason: HOSPADM

## 2024-02-04 RX ORDER — NALOXONE HCL 0.4 MG/ML
0.4 VIAL (ML) INJECTION
Status: DISCONTINUED | OUTPATIENT
Start: 2024-02-04 | End: 2024-02-05 | Stop reason: HOSPADM

## 2024-02-04 RX ORDER — FAMOTIDINE 10 MG/ML
20 INJECTION, SOLUTION INTRAVENOUS EVERY 12 HOURS PRN
Status: DISCONTINUED | OUTPATIENT
Start: 2024-02-04 | End: 2024-02-05 | Stop reason: HOSPADM

## 2024-02-04 RX ORDER — MORPHINE SULFATE 2 MG/ML
2 INJECTION, SOLUTION INTRAMUSCULAR; INTRAVENOUS EVERY 4 HOURS PRN
Status: DISCONTINUED | OUTPATIENT
Start: 2024-02-04 | End: 2024-02-05 | Stop reason: HOSPADM

## 2024-02-04 RX ORDER — ONDANSETRON 4 MG/1
4 TABLET, ORALLY DISINTEGRATING ORAL EVERY 6 HOURS PRN
Status: DISCONTINUED | OUTPATIENT
Start: 2024-02-04 | End: 2024-02-05 | Stop reason: HOSPADM

## 2024-02-04 RX ORDER — CITRIC ACID/SODIUM CITRATE 334-500MG
30 SOLUTION, ORAL ORAL ONCE
Status: DISCONTINUED | OUTPATIENT
Start: 2024-02-04 | End: 2024-02-05 | Stop reason: HOSPADM

## 2024-02-04 RX ORDER — ONDANSETRON 2 MG/ML
4 INJECTION INTRAMUSCULAR; INTRAVENOUS EVERY 6 HOURS PRN
Status: DISCONTINUED | OUTPATIENT
Start: 2024-02-04 | End: 2024-02-05 | Stop reason: HOSPADM

## 2024-02-04 RX ORDER — MAGNESIUM CARB/ALUMINUM HYDROX 105-160MG
30 TABLET,CHEWABLE ORAL ONCE
Status: DISCONTINUED | OUTPATIENT
Start: 2024-02-04 | End: 2024-02-05 | Stop reason: HOSPADM

## 2024-02-04 RX ORDER — ONDANSETRON 2 MG/ML
4 INJECTION INTRAMUSCULAR; INTRAVENOUS ONCE AS NEEDED
Status: DISCONTINUED | OUTPATIENT
Start: 2024-02-04 | End: 2024-02-05 | Stop reason: HOSPADM

## 2024-02-04 RX ORDER — FENTANYL CIT 0.2 MG/100ML-ROPIV 0.2%-NACL 0.9% EPIDURAL INJ 2/0.2 MCG/ML-%
8 SOLUTION INJECTION CONTINUOUS
Status: DISCONTINUED | OUTPATIENT
Start: 2024-02-04 | End: 2024-02-05

## 2024-02-04 RX ORDER — LIDOCAINE HYDROCHLORIDE 10 MG/ML
0.5 INJECTION, SOLUTION INFILTRATION; PERINEURAL ONCE AS NEEDED
Status: DISCONTINUED | OUTPATIENT
Start: 2024-02-04 | End: 2024-02-05 | Stop reason: HOSPADM

## 2024-02-04 RX ORDER — EPHEDRINE SULFATE 50 MG/ML
5 INJECTION, SOLUTION INTRAVENOUS
Status: DISCONTINUED | OUTPATIENT
Start: 2024-02-04 | End: 2024-02-05 | Stop reason: HOSPADM

## 2024-02-04 RX ORDER — ACETAMINOPHEN 325 MG/1
650 TABLET ORAL EVERY 4 HOURS PRN
Status: DISCONTINUED | OUTPATIENT
Start: 2024-02-04 | End: 2024-02-05 | Stop reason: HOSPADM

## 2024-02-04 RX ORDER — FAMOTIDINE 10 MG/ML
20 INJECTION, SOLUTION INTRAVENOUS ONCE AS NEEDED
Status: DISCONTINUED | OUTPATIENT
Start: 2024-02-04 | End: 2024-02-05 | Stop reason: HOSPADM

## 2024-02-04 RX ORDER — SODIUM CHLORIDE 0.9 % (FLUSH) 0.9 %
10 SYRINGE (ML) INJECTION AS NEEDED
Status: DISCONTINUED | OUTPATIENT
Start: 2024-02-04 | End: 2024-02-05 | Stop reason: HOSPADM

## 2024-02-04 RX ORDER — PENICILLIN G 3000000 [IU]/50ML
3 INJECTION, SOLUTION INTRAVENOUS EVERY 4 HOURS
Status: DISCONTINUED | OUTPATIENT
Start: 2024-02-05 | End: 2024-02-05 | Stop reason: HOSPADM

## 2024-02-04 RX ORDER — MISOPROSTOL 100 MCG
25 TABLET ORAL
Status: DISCONTINUED | OUTPATIENT
Start: 2024-02-04 | End: 2024-02-05

## 2024-02-04 RX ORDER — TERBUTALINE SULFATE 1 MG/ML
0.25 INJECTION, SOLUTION SUBCUTANEOUS AS NEEDED
Status: DISCONTINUED | OUTPATIENT
Start: 2024-02-04 | End: 2024-02-05 | Stop reason: HOSPADM

## 2024-02-04 RX ORDER — SODIUM CHLORIDE 0.9 % (FLUSH) 0.9 %
10 SYRINGE (ML) INJECTION EVERY 12 HOURS SCHEDULED
Status: DISCONTINUED | OUTPATIENT
Start: 2024-02-04 | End: 2024-02-05 | Stop reason: HOSPADM

## 2024-02-04 RX ORDER — SODIUM CHLORIDE, SODIUM LACTATE, POTASSIUM CHLORIDE, CALCIUM CHLORIDE 600; 310; 30; 20 MG/100ML; MG/100ML; MG/100ML; MG/100ML
125 INJECTION, SOLUTION INTRAVENOUS CONTINUOUS
Status: DISCONTINUED | OUTPATIENT
Start: 2024-02-04 | End: 2024-02-05

## 2024-02-04 RX ADMIN — Medication 25 MCG: at 21:45

## 2024-02-05 LAB
ABO GROUP BLD: NORMAL
ATMOSPHERIC PRESS: 748.8 MMHG
BASE EXCESS BLDCOV CALC-SCNC: -5.5 MMOL/L (ref -30–30)
BDY SITE: ABNORMAL
CO2 BLDA-SCNC: 24 MMOL/L (ref 23–27)
DEVICE COMMENT: ABNORMAL
FETAL BLEED: NEGATIVE
HCO3 BLDCOV-SCNC: 22.4 MMOL/L
MODALITY: ABNORMAL
NUMBER OF DOSES: NORMAL
PCO2 BLDCOV: 51.5 MM HG (ref 35–51.3)
PH BLDCOV: 7.25 PH UNITS (ref 7.26–7.4)
PO2 BLDCOV: <18.1 MM HG (ref 19–39)
RH BLD: NEGATIVE
SAO2 % BLDCOV: ABNORMAL %

## 2024-02-05 PROCEDURE — 25010000002 TERBUTALINE PER 1 MG: Performed by: OBSTETRICS & GYNECOLOGY

## 2024-02-05 PROCEDURE — 85461 HEMOGLOBIN FETAL: CPT | Performed by: OBSTETRICS & GYNECOLOGY

## 2024-02-05 PROCEDURE — 25010000002 PENICILLIN G POTASSIUM PER 600000 UNITS: Performed by: OBSTETRICS & GYNECOLOGY

## 2024-02-05 PROCEDURE — 59410 OBSTETRICAL CARE: CPT | Performed by: OBSTETRICS & GYNECOLOGY

## 2024-02-05 PROCEDURE — 82803 BLOOD GASES ANY COMBINATION: CPT

## 2024-02-05 PROCEDURE — 25010000002 ROPIVACAINE PER 1 MG: Performed by: ANESTHESIOLOGY

## 2024-02-05 PROCEDURE — C1755 CATHETER, INTRASPINAL: HCPCS | Performed by: ANESTHESIOLOGY

## 2024-02-05 PROCEDURE — 25010000002 MORPHINE PER 10 MG: Performed by: OBSTETRICS & GYNECOLOGY

## 2024-02-05 PROCEDURE — 86901 BLOOD TYPING SEROLOGIC RH(D): CPT | Performed by: OBSTETRICS & GYNECOLOGY

## 2024-02-05 PROCEDURE — 86900 BLOOD TYPING SEROLOGIC ABO: CPT | Performed by: OBSTETRICS & GYNECOLOGY

## 2024-02-05 PROCEDURE — 0HQ9XZZ REPAIR PERINEUM SKIN, EXTERNAL APPROACH: ICD-10-PCS | Performed by: OBSTETRICS & GYNECOLOGY

## 2024-02-05 PROCEDURE — 25810000003 LACTATED RINGERS PER 1000 ML: Performed by: OBSTETRICS & GYNECOLOGY

## 2024-02-05 PROCEDURE — 25010000002 ONDANSETRON PER 1 MG: Performed by: OBSTETRICS & GYNECOLOGY

## 2024-02-05 RX ORDER — KETOROLAC TROMETHAMINE 30 MG/ML
30 INJECTION, SOLUTION INTRAMUSCULAR; INTRAVENOUS ONCE
OUTPATIENT
Start: 2024-02-05 | End: 2024-02-05

## 2024-02-05 RX ORDER — ONDANSETRON 4 MG/1
4 TABLET, ORALLY DISINTEGRATING ORAL EVERY 6 HOURS PRN
Status: DISCONTINUED | OUTPATIENT
Start: 2024-02-05 | End: 2024-02-05 | Stop reason: HOSPADM

## 2024-02-05 RX ORDER — CARBOPROST TROMETHAMINE 250 UG/ML
250 INJECTION, SOLUTION INTRAMUSCULAR AS NEEDED
Status: DISCONTINUED | OUTPATIENT
Start: 2024-02-05 | End: 2024-02-05 | Stop reason: HOSPADM

## 2024-02-05 RX ORDER — METHYLERGONOVINE MALEATE 0.2 MG/ML
200 INJECTION INTRAVENOUS ONCE AS NEEDED
Status: DISCONTINUED | OUTPATIENT
Start: 2024-02-05 | End: 2024-02-07 | Stop reason: HOSPADM

## 2024-02-05 RX ORDER — OXYTOCIN/0.9 % SODIUM CHLORIDE 30/500 ML
125 PLASTIC BAG, INJECTION (ML) INTRAVENOUS CONTINUOUS PRN
Status: COMPLETED | OUTPATIENT
Start: 2024-02-05 | End: 2024-02-05

## 2024-02-05 RX ORDER — ACETAMINOPHEN 325 MG/1
650 TABLET ORAL EVERY 4 HOURS PRN
Status: DISCONTINUED | OUTPATIENT
Start: 2024-02-05 | End: 2024-02-05 | Stop reason: HOSPADM

## 2024-02-05 RX ORDER — IBUPROFEN 600 MG/1
600 TABLET ORAL EVERY 6 HOURS PRN
Status: DISCONTINUED | OUTPATIENT
Start: 2024-02-05 | End: 2024-02-07 | Stop reason: HOSPADM

## 2024-02-05 RX ORDER — OXYTOCIN/0.9 % SODIUM CHLORIDE 30/500 ML
250 PLASTIC BAG, INJECTION (ML) INTRAVENOUS CONTINUOUS
OUTPATIENT
Start: 2024-02-05 | End: 2024-02-05

## 2024-02-05 RX ORDER — ACETAMINOPHEN 325 MG/1
650 TABLET ORAL EVERY 6 HOURS PRN
Status: DISCONTINUED | OUTPATIENT
Start: 2024-02-05 | End: 2024-02-07 | Stop reason: HOSPADM

## 2024-02-05 RX ORDER — SODIUM CHLORIDE 9 MG/ML
125 INJECTION, SOLUTION INTRAVENOUS CONTINUOUS
Status: DISCONTINUED | OUTPATIENT
Start: 2024-02-05 | End: 2024-02-05

## 2024-02-05 RX ORDER — HYDROCORTISONE 25 MG/G
CREAM TOPICAL AS NEEDED
Status: DISCONTINUED | OUTPATIENT
Start: 2024-02-05 | End: 2024-02-07 | Stop reason: HOSPADM

## 2024-02-05 RX ORDER — CARBOPROST TROMETHAMINE 250 UG/ML
250 INJECTION, SOLUTION INTRAMUSCULAR ONCE AS NEEDED
Status: DISCONTINUED | OUTPATIENT
Start: 2024-02-05 | End: 2024-02-07 | Stop reason: HOSPADM

## 2024-02-05 RX ORDER — OXYTOCIN/0.9 % SODIUM CHLORIDE 30/500 ML
125 PLASTIC BAG, INJECTION (ML) INTRAVENOUS CONTINUOUS PRN
Status: DISCONTINUED | OUTPATIENT
Start: 2024-02-05 | End: 2024-02-07 | Stop reason: HOSPADM

## 2024-02-05 RX ORDER — CEFAZOLIN SODIUM 2 G/100ML
2000 INJECTION, SOLUTION INTRAVENOUS ONCE
Status: DISCONTINUED | OUTPATIENT
Start: 2024-02-05 | End: 2024-02-05

## 2024-02-05 RX ORDER — MISOPROSTOL 200 UG/1
600 TABLET ORAL ONCE AS NEEDED
Status: DISCONTINUED | OUTPATIENT
Start: 2024-02-05 | End: 2024-02-07 | Stop reason: HOSPADM

## 2024-02-05 RX ORDER — OXYTOCIN/0.9 % SODIUM CHLORIDE 30/500 ML
999 PLASTIC BAG, INJECTION (ML) INTRAVENOUS ONCE
Status: COMPLETED | OUTPATIENT
Start: 2024-02-05 | End: 2024-02-05

## 2024-02-05 RX ORDER — TRAMADOL HYDROCHLORIDE 50 MG/1
50 TABLET ORAL EVERY 6 HOURS PRN
Status: DISCONTINUED | OUTPATIENT
Start: 2024-02-05 | End: 2024-02-07 | Stop reason: HOSPADM

## 2024-02-05 RX ORDER — MISOPROSTOL 200 UG/1
800 TABLET ORAL AS NEEDED
Status: DISCONTINUED | OUTPATIENT
Start: 2024-02-05 | End: 2024-02-05 | Stop reason: HOSPADM

## 2024-02-05 RX ORDER — ROPIVACAINE HYDROCHLORIDE 2 MG/ML
INJECTION, SOLUTION EPIDURAL; INFILTRATION; PERINEURAL AS NEEDED
Status: DISCONTINUED | OUTPATIENT
Start: 2024-02-05 | End: 2024-02-05 | Stop reason: SURG

## 2024-02-05 RX ORDER — OXYTOCIN/0.9 % SODIUM CHLORIDE 30/500 ML
250 PLASTIC BAG, INJECTION (ML) INTRAVENOUS CONTINUOUS
Status: ACTIVE | OUTPATIENT
Start: 2024-02-05 | End: 2024-02-05

## 2024-02-05 RX ORDER — ACETAMINOPHEN 500 MG
1000 TABLET ORAL ONCE
Status: DISCONTINUED | OUTPATIENT
Start: 2024-02-05 | End: 2024-02-05 | Stop reason: HOSPADM

## 2024-02-05 RX ORDER — METHYLERGONOVINE MALEATE 0.2 MG/ML
200 INJECTION INTRAVENOUS ONCE AS NEEDED
Status: DISCONTINUED | OUTPATIENT
Start: 2024-02-05 | End: 2024-02-05 | Stop reason: HOSPADM

## 2024-02-05 RX ORDER — FAMOTIDINE 10 MG/ML
20 INJECTION, SOLUTION INTRAVENOUS ONCE AS NEEDED
Status: DISCONTINUED | OUTPATIENT
Start: 2024-02-05 | End: 2024-02-05 | Stop reason: HOSPADM

## 2024-02-05 RX ORDER — PRENATAL VIT/IRON FUM/FOLIC AC 27MG-0.8MG
1 TABLET ORAL DAILY
Status: DISCONTINUED | OUTPATIENT
Start: 2024-02-06 | End: 2024-02-07 | Stop reason: HOSPADM

## 2024-02-05 RX ORDER — ONDANSETRON 2 MG/ML
4 INJECTION INTRAMUSCULAR; INTRAVENOUS EVERY 6 HOURS PRN
Status: DISCONTINUED | OUTPATIENT
Start: 2024-02-05 | End: 2024-02-07 | Stop reason: HOSPADM

## 2024-02-05 RX ORDER — CALCIUM CARBONATE 500 MG/1
2 TABLET, CHEWABLE ORAL 3 TIMES DAILY PRN
Status: DISCONTINUED | OUTPATIENT
Start: 2024-02-05 | End: 2024-02-07 | Stop reason: HOSPADM

## 2024-02-05 RX ORDER — SODIUM CHLORIDE 0.9 % (FLUSH) 0.9 %
1-10 SYRINGE (ML) INJECTION AS NEEDED
Status: DISCONTINUED | OUTPATIENT
Start: 2024-02-05 | End: 2024-02-07 | Stop reason: HOSPADM

## 2024-02-05 RX ORDER — DOCUSATE SODIUM 100 MG/1
100 CAPSULE, LIQUID FILLED ORAL 2 TIMES DAILY
Status: DISCONTINUED | OUTPATIENT
Start: 2024-02-06 | End: 2024-02-07 | Stop reason: HOSPADM

## 2024-02-05 RX ORDER — ONDANSETRON 2 MG/ML
4 INJECTION INTRAMUSCULAR; INTRAVENOUS EVERY 6 HOURS PRN
Status: DISCONTINUED | OUTPATIENT
Start: 2024-02-05 | End: 2024-02-05 | Stop reason: HOSPADM

## 2024-02-05 RX ORDER — OXYTOCIN/0.9 % SODIUM CHLORIDE 30/500 ML
999 PLASTIC BAG, INJECTION (ML) INTRAVENOUS ONCE
OUTPATIENT
Start: 2024-02-05 | End: 2024-02-05

## 2024-02-05 RX ORDER — BISACODYL 10 MG
10 SUPPOSITORY, RECTAL RECTAL DAILY PRN
Status: DISCONTINUED | OUTPATIENT
Start: 2024-02-06 | End: 2024-02-07 | Stop reason: HOSPADM

## 2024-02-05 RX ADMIN — SODIUM CHLORIDE, POTASSIUM CHLORIDE, SODIUM LACTATE AND CALCIUM CHLORIDE 125 ML/HR: 600; 310; 30; 20 INJECTION, SOLUTION INTRAVENOUS at 06:45

## 2024-02-05 RX ADMIN — ROPIVACAINE HYDROCHLORIDE 5 ML: 2 INJECTION, SOLUTION EPIDURAL; INFILTRATION at 08:10

## 2024-02-05 RX ADMIN — SODIUM CHLORIDE, POTASSIUM CHLORIDE, SODIUM LACTATE AND CALCIUM CHLORIDE 125 ML/HR: 600; 310; 30; 20 INJECTION, SOLUTION INTRAVENOUS at 17:15

## 2024-02-05 RX ADMIN — SODIUM CHLORIDE 5 MILLION UNITS: 9 INJECTION, SOLUTION INTRAVENOUS at 09:55

## 2024-02-05 RX ADMIN — Medication 8 ML/HR: at 17:35

## 2024-02-05 RX ADMIN — Medication 125 ML/HR: at 20:59

## 2024-02-05 RX ADMIN — Medication 10 ML/HR: at 08:16

## 2024-02-05 RX ADMIN — TERBUTALINE SULFATE 0.25 MG: 1 INJECTION, SOLUTION SUBCUTANEOUS at 09:11

## 2024-02-05 RX ADMIN — PENICILLIN G 3 MILLION UNITS: 3000000 INJECTION, SOLUTION INTRAVENOUS at 14:20

## 2024-02-05 RX ADMIN — MISOPROSTOL 400 MCG: 200 TABLET ORAL at 19:45

## 2024-02-05 RX ADMIN — MORPHINE SULFATE 2 MG: 2 INJECTION, SOLUTION INTRAMUSCULAR; INTRAVENOUS at 06:46

## 2024-02-05 RX ADMIN — ONDANSETRON 4 MG: 2 INJECTION INTRAMUSCULAR; INTRAVENOUS at 14:44

## 2024-02-05 RX ADMIN — ROPIVACAINE HYDROCHLORIDE 4 ML: 2 INJECTION, SOLUTION EPIDURAL; INFILTRATION at 08:12

## 2024-02-05 RX ADMIN — Medication: at 23:57

## 2024-02-05 RX ADMIN — Medication 25 MCG: at 02:06

## 2024-02-05 RX ADMIN — ROPIVACAINE HYDROCHLORIDE 4 ML: 2 INJECTION, SOLUTION EPIDURAL; INFILTRATION at 08:14

## 2024-02-05 RX ADMIN — SODIUM CHLORIDE, POTASSIUM CHLORIDE, SODIUM LACTATE AND CALCIUM CHLORIDE 125 ML/HR: 600; 310; 30; 20 INJECTION, SOLUTION INTRAVENOUS at 10:00

## 2024-02-05 RX ADMIN — DOCUSATE SODIUM 100 MG: 100 CAPSULE, LIQUID FILLED ORAL at 23:57

## 2024-02-05 RX ADMIN — IBUPROFEN 600 MG: 600 TABLET, FILM COATED ORAL at 23:57

## 2024-02-05 RX ADMIN — SODIUM CHLORIDE, POTASSIUM CHLORIDE, SODIUM LACTATE AND CALCIUM CHLORIDE 125 ML/HR: 600; 310; 30; 20 INJECTION, SOLUTION INTRAVENOUS at 09:00

## 2024-02-05 RX ADMIN — Medication 250 ML/HR: at 19:56

## 2024-02-05 RX ADMIN — Medication 999 ML/HR: at 19:41

## 2024-02-05 NOTE — PLAN OF CARE
Goal Outcome Evaluation:              Outcome Evaluation: New admit IOL

## 2024-02-05 NOTE — NURSING NOTE
Called Dr Etienne and notified him that pt just had another prolonged deceleration and terbutaline was given. Fhr has currently recovered. Md is in the building and available as needed.

## 2024-02-05 NOTE — NURSING NOTE
I wrote a nursing note under riley SKY RN in error. This RN called dr. Etienne @ 0508 to notifiy him of prolonged deceleration, pt has been turned, given terbutaline. MD is in hospital and will come see pt again. erg

## 2024-02-05 NOTE — PROGRESS NOTES
Subjective: Patient comfortable.    O:  Vitals:    24 0823 24 0827 24 1100 24 1130   BP: 127/77 120/67 108/53 103/51   Pulse: 77 72 93 86   Resp:       Temp:       TempSrc:       SpO2:  100%     Weight:       Height:         Pelvic exam performed in the presence of a female RN chaperone.  Patient's partner was also present for pelvic exam.  Patient provided verbal consent to proceed with pelvic exam.        Cervix: 4 cm dilated/90% effaced/-1  Artificial rupture membranes performed with clear fluid noted.  IUPC placed and working well.    NST: 130/reactive/moderate variability/no decelerations  Comments: Contractions about every 2-3 minutes    Assessment:  1.  23-year-old  1 at 39-3/7 weeks gestational age  2.  GBS positive  3.  Fetal heart tones currently category 1    Plan:  1.  Continue with trial of labor.  Monitor contraction pattern now that IUPC is in place.  Can augment with Pitocin if necessary.  Titrate to achieve a goal of 200-220 Howes units.    Patient had some variable and occasionally prolonged decelerations earlier.  If these were to recur could start amnioinfusion through IUPC.  Continue penicillin.

## 2024-02-05 NOTE — ANESTHESIA PREPROCEDURE EVALUATION
Anesthesia Evaluation     Patient summary reviewed and Nursing notes reviewed   NPO Solid Status: > 8 hours             Airway   Mallampati: II  TM distance: >3 FB  Neck ROM: full  no difficulty expected  Dental - normal exam     Pulmonary - negative pulmonary ROS and normal exam   Cardiovascular - negative cardio ROS and normal exam        Neuro/Psych- negative ROS  GI/Hepatic/Renal/Endo - negative ROS     Musculoskeletal (-) negative ROS    Abdominal  - normal exam   Substance History - negative use     OB/GYN    (+) Pregnant        Other - negative ROS                   Anesthesia Plan    ASA 2     epidural     (  39w3d  )    Anesthetic plan, risks, benefits, and alternatives have been provided, discussed and informed consent has been obtained with: patient.    CODE STATUS:    Code Status (Patient has no pulse and is not breathing): CPR (Attempt to Resuscitate)  Medical Interventions (Patient has pulse or is breathing): Full Support

## 2024-02-05 NOTE — ANESTHESIA PROCEDURE NOTES
Labor Epidural      Patient location during procedure: OB  Start Time: 2/5/2024 8:05 AM  Stop Time: 2/5/2024 8:16 AM  Indication:at surgeon's request  Performed By  Anesthesiologist: Walter Heck MD  Preanesthetic Checklist  Completed: patient identified, IV checked, site marked, risks and benefits discussed, surgical consent, monitors and equipment checked, pre-op evaluation and timeout performed  Prep:  Pt Position:sitting  Sterile Tech:cap, gloves and mask  Prep:povidone-iodine 7.5% surgical scrub  Monitoring:blood pressure monitoring, continuous pulse oximetry and EKG  Epidural Block Procedure:  Approach:midline  Guidance:landmark technique  Location:L4-L5  Needle Type:Tuohy  Needle Gauge:17 and 17 G  Loss of Resistance Medium: air  Loss of Resistance: 7cm  Cath Depth at skin:10 cm  Paresthesia: none  Aspiration:negative  Test Dose:negative  Number of Attempts: 1  Post Assessment:  Dressing:occlusive dressing applied and secured with tape  Pt Tolerance:patient tolerated the procedure well with no apparent complications  Complications:no

## 2024-02-05 NOTE — H&P
[facility]   HISTORY AND PHYSICAL    Patient Name: Rita Rivera  : 2000  MRN: [id number]  Primary Care Physician:  Aylin Kuhn APRN  Date of admission: 2024    Subjective   Subjective     Chief Complaint: Labor induction    HPI:    Rita Rivera is a 23 y.o. female presented last night for labor induction for term 39+ weeks.  This pregnancy has been largely uncomplicated.  She is in her usual state of health at this time.    Review of Systems    Constitutional: No fevers, chills, sweats   Eye: No recent visual problems, denies blurry vision   HEENT: No ear pain, nasal congestion, sore throat, voice changes   Respiratory: No shortness of breath, cough, pain on breathing   Cardiovascular: No Chest pain, palpitations   Gastrointestinal: No nausea, vomiting, diarrhea, constipation   Genitourinary: No hematuria, dysuria, lesions on genitalia   Hema/Lymph: Negative for bruising, no edema   Endocrine: Negative for excessive thirst, excessive hunger, heat or cold intolerance   Musculoskeletal: No joint pain, muscle pain, decreased range of motion   Integumentary: No rash, pruritus, abrasions, lesions   Neurologic: No weakness, numbness, headaches   Psychiatric: No anxiety, depression, mood changes         Personal History     History reviewed. No pertinent past medical history.    History reviewed. No pertinent surgical history.    Family History: family history includes Breast cancer in an other family member; No Known Problems in her mother. Otherwise pertinent FHx was reviewed and not pertinent to current issue.    Social History:  reports that she has never smoked. She does not have any smokeless tobacco history on file. She reports that she does not currently use alcohol. She reports that she does not use drugs.    Home Medications:  Doxylamine-Pyridoxine, ferrous sulfate, pantoprazole, and prenatal vitamin 28-0.8      Allergies:  No Known  Allergies    Objective   Objective     Vitals:   Temp:  [97.9 °F (36.6 °C)-98.7 °F (37.1 °C)] 97.9 °F (36.6 °C)  Heart Rate:  [70-93] 86  Resp:  [16] 16  BP: (103-129)/(49-77) 103/51  Physical Exam     General: No acute distress, awake and oriented x3   HEENT: Normocephalic atraumatic, moist mucous membranes   Eyes: Pupils equal round reactive to light and accommodation, extraocular muscles intact   Respiratory: Normal work of breathing, good air movement   Cardiovascular: Regular rate and rhythm, no murmurs   Abdomen: Gravid, soft, nontender   Pelvic exam performed by RN at 6:00:  3-4 cm / 80% effaced/-1  Extremities: No calf tenderness, no lower extremity edema   Psychiatric: Good judgment and insight, normal affect and mood   Neurologic: Cranial nerves II through XII intact, no deficits   Skin: No rashes or lesions       Result Review    Result Review:  I have personally reviewed the results from the time of this admission to 2/5/2024 12:07 EST and agree with these findings:  [x]  Laboratory list / accordion  [x]  Microbiology  [x]  Radiology  []  EKG/Telemetry   []  Cardiology/Vascular   []  Pathology  []  Old records  [x]  Other: NST  Most notable findings include:   Lab Results   Component Value Date    WBC 9.89 02/04/2024    HGB 11.2 (L) 02/04/2024    HCT 33.0 (L) 02/04/2024    MCV 92.7 02/04/2024     02/04/2024     Component  Ref Range & Units    Strep Gp B KAYLEY  Negative Positive Abnormal      Most recent ultrasound (12/21/2023):  Appropriate growth for gestational age with the estimated fetal weight at the 40th percentile.  Abdominal circumference is at the 41st percentile.  Normal amniotic fluid  Cephalic presentation    NST: 130/reactive/moderate variability/intermittent variable and occasionally prolonged decelerations.  These happen on the order of roughly 1/h.  They have responded to maternal positioning and other intrauterine resuscitative efforts.  Tocometry: Contractions every 1-3  minutes      Assessment & Plan   Assessment / Plan     Brief Patient Summary:  Rita Rivera is a 23 y.o. female  1 at 39-3/7 weeks gestational age  2.  GBS positive  3.  Fetal heart tones category 2, but overall reassuring with reactivity and moderate variability    Active Hospital Problems:  Active Hospital Problems    Diagnosis     **Supervision of normal first pregnancy in third trimester      Plan:   1. Patient and family extensively counseled about indications for labor induction. Discussed risks of induction/delivery including bleeding, blood transfusion, infection, perineal laceration, laceration repair, pain, scar, failure of repair, urinary retention, DVT, anesthesia complications, temporary or permanent nerve damage, organ failure, and even death. Also discussed possibility of  delivery and associated risks of  delivery. Risks of  delivery discussed including bleeding, blood transfusion, infection, scar, wound breakdown, pain, need for postop pain medications, inadvertent injury to GI/ structures and possible need for surgical repair at time of surgery or in the future, urinary retention, DVT, anesthesia complications, temporary or permanent nerve damage, organ failure, and even death as well as potential need for  section in future pregnancies and subsequent morbidity therein.   2.  Patient received 2 doses of Vasoprost all throughout the night.  She has had some intermittent variable and occasionally prolonged decelerations, but overall the tracing has been reassuring with moderate variability throughout and the maintenance of her activity.  3.  I feel continued trial of labor is warranted.  I have discussed the above findings and concerns with the patient and her family at length.  She is making adequate progress in early labor and is hopeful for vaginal delivery, so I would like to continue to proceed with a trial of labor.  She is currently  ana too frequently to have any further augmentation, so we will continue expectant management for now.  4.  Continue penicillin through labor until delivery for GBS positive.    DVT prophylaxis:  Mechanical DVT prophylaxis orders are present.        CODE STATUS:    Code Status (Patient has no pulse and is not breathing): CPR (Attempt to Resuscitate)  Medical Interventions (Patient has pulse or is breathing): Full Support    Admission Status:  I believe this patient meets inpatient status.    Rock Etienne MD

## 2024-02-06 LAB
BASOPHILS # BLD AUTO: 0.06 10*3/MM3 (ref 0–0.2)
BASOPHILS NFR BLD AUTO: 0.4 % (ref 0–1.5)
DEPRECATED RDW RBC AUTO: 46.4 FL (ref 37–54)
EOSINOPHIL # BLD AUTO: 0.1 10*3/MM3 (ref 0–0.4)
EOSINOPHIL NFR BLD AUTO: 0.7 % (ref 0.3–6.2)
ERYTHROCYTE [DISTWIDTH] IN BLOOD BY AUTOMATED COUNT: 13.7 % (ref 12.3–15.4)
HCT VFR BLD AUTO: 29.4 % (ref 34–46.6)
HGB BLD-MCNC: 10.1 G/DL (ref 12–15.9)
IMM GRANULOCYTES # BLD AUTO: 0.12 10*3/MM3 (ref 0–0.05)
IMM GRANULOCYTES NFR BLD AUTO: 0.9 % (ref 0–0.5)
LYMPHOCYTES # BLD AUTO: 1.88 10*3/MM3 (ref 0.7–3.1)
LYMPHOCYTES NFR BLD AUTO: 13.9 % (ref 19.6–45.3)
MCH RBC QN AUTO: 31.8 PG (ref 26.6–33)
MCHC RBC AUTO-ENTMCNC: 34.4 G/DL (ref 31.5–35.7)
MCV RBC AUTO: 92.5 FL (ref 79–97)
MONOCYTES # BLD AUTO: 1.56 10*3/MM3 (ref 0.1–0.9)
MONOCYTES NFR BLD AUTO: 11.6 % (ref 5–12)
NEUTROPHILS NFR BLD AUTO: 72.5 % (ref 42.7–76)
NEUTROPHILS NFR BLD AUTO: 9.77 10*3/MM3 (ref 1.7–7)
NRBC BLD AUTO-RTO: 0 /100 WBC (ref 0–0.2)
PLATELET # BLD AUTO: 220 10*3/MM3 (ref 140–450)
PMV BLD AUTO: 10.7 FL (ref 6–12)
RBC # BLD AUTO: 3.18 10*6/MM3 (ref 3.77–5.28)
WBC NRBC COR # BLD AUTO: 13.49 10*3/MM3 (ref 3.4–10.8)

## 2024-02-06 PROCEDURE — 25010000002 RHO D IMMUNE GLOBULIN 1500 UNIT/2ML SOLUTION PREFILLED SYRINGE: Performed by: OBSTETRICS & GYNECOLOGY

## 2024-02-06 PROCEDURE — 0503F POSTPARTUM CARE VISIT: CPT | Performed by: NURSE PRACTITIONER

## 2024-02-06 PROCEDURE — 85025 COMPLETE CBC W/AUTO DIFF WBC: CPT | Performed by: OBSTETRICS & GYNECOLOGY

## 2024-02-06 RX ADMIN — IBUPROFEN 600 MG: 600 TABLET, FILM COATED ORAL at 08:55

## 2024-02-06 RX ADMIN — Medication 1 TABLET: at 08:55

## 2024-02-06 RX ADMIN — HUMAN RHO(D) IMMUNE GLOBULIN 1500 UNITS: 1500 SOLUTION INTRAMUSCULAR; INTRAVENOUS at 02:01

## 2024-02-06 RX ADMIN — IBUPROFEN 600 MG: 600 TABLET, FILM COATED ORAL at 23:53

## 2024-02-06 RX ADMIN — IBUPROFEN 600 MG: 600 TABLET, FILM COATED ORAL at 15:54

## 2024-02-06 NOTE — ANESTHESIA POSTPROCEDURE EVALUATION
Patient: Rita Rivera    Procedure Summary       Date: 02/05/24 Room / Location:     Anesthesia Start: 0805 Anesthesia Stop: 1935    Procedure: LABOR ANALGESIA Diagnosis:     Scheduled Providers:  Provider: Walter Heck MD    Anesthesia Type: epidural ASA Status: 2            Anesthesia Type: epidural    Vitals  Vitals Value Taken Time   /61 02/05/24 2045   Temp 37.3 °C (99.2 °F) 02/05/24 1945   Pulse 78 02/05/24 2045   Resp 16 02/05/24 2045   SpO2 97 % 02/05/24 2005   Vitals shown include unfiled device data.        Anesthesia Post Evaluation

## 2024-02-06 NOTE — LACTATION NOTE
This note was copied from a baby's chart.  Patient called for latch check.  Patient is currently breast feeding infant in cradle/laid back hold to the right breast.  Infant's lips are flanged and she has deep jaw movements.  Demonstrated how to stimulate infant to continue feeding if she becomes sleepy.  Patient denies pain and appears comfortable and at ease.  Reassured patient, encouraged to call with any questions.

## 2024-02-06 NOTE — LACTATION NOTE
This note was copied from a baby's chart.  P1T, new admission.  Patient reports infant has breast fed twice since delivery.  She has fed on both breasts.  Educated patient on offering the breast every 2-3 hours or as infant displays feeding cues, using different breast feeding positions for comfort, and expected output.  Patient concerned she has enough milk; educated on colostrum and feeding frequently to bring in mature milk supply.  Patient has not received PBP yet, paper work started to get her one before discharge.  Offered latch check at next feeding, she will call if desires.  Patient denies questions or concerns at this time.  LC number on WB, will follow as needed.

## 2024-02-06 NOTE — LACTATION NOTE
This note was copied from a baby's chart.  P1 term baby. Baby has been nursing well with one wet and one stool so far today. Mom is worried baby is not getting enough milk. Showed her the hand pump again, encouraged pumping 15 min bilaterally. Spoke to Mom via  [phone].  Checked on Mom again, she did not pump 30 minutes, 1ml was given to baby. Showed her her Spectra pump. Discussed pump operation and cleaning of pump parts. Encouraged pumping every 3hrs if baby is showing feeding cues. Encouraged to call for any assistance. Spoke with Mom via  [phone].

## 2024-02-06 NOTE — L&D DELIVERY NOTE
[facility]   Vaginal Delivery Note    Patient Name: Rita Rivera  : 2000  MRN: [id number]    Date of Delivery: 2024     Diagnosis   Entirety of today's encounter including patient interview, exam, and counseling performed with the aid of a medical  via the telephone.     Pre & Post-Delivery:  Intrauterine pregnancy at 39w3d  Labor status: Induced Onset of Labor     Supervision of normal first pregnancy in third trimester     (spontaneous vaginal delivery)             Problem List    Transfer to Postpartum     Review the Delivery Report for details.     Delivery     Delivery: Vaginal, Vacuum (Extractor)     YOB: 2022    Time of Birth:  Gestational Age 7:35 PM   39w3d     Anesthesia: Epidural     Delivering clinician: Rock Etienne    Forceps?   No   Vacuum? Yes  Vacuum Delivery  Vacuum attempted? Yes     Vacuum indication:  Fetal Bradycardia   Vacuum type: Kiwi    Application location:  2 cm anterior to posterior fontanelle in the midline   First Attempt     Time applied:  19:34:10   Time removed:  19:34:30   Second Attempt    Time applied:  NA   Time removed:     Third Attempt    Time applied:  NA   Time removed:     Number of pulls: 2    Number of pop-offs: 0    Low-end pressure range:     High-end pressure range:     Total application time:  20 seconds   Applied by: ROCK ETIENNE MD    Failed? No        Shoulder dystocia present: Yes Shoulder Dystocia Details  Dystocia Present? Yes   Time head delivered:    Gentle attempt at traction, assisted by maternal expulsive forces: Yes   If no, why:    Anterior shoulder: Left   Time recognized: 2024  7:34 PM     Time help called: 2024  7:34 PM  Called by: Cora Harrison RN    Time provider arrived: 2024  7:00 PM  Provider who arrived: Lowell    Time NICU arrived: 2024  7:00 PM  NICU staff:     Time additional staff arrived: 2024  7:34 PM   Additional staff:            Delivery narrative:  I came to the room when the cervix was completely dilated, completely effaced, and +2 station. Under continuous external fetal heart rate monitoring the patient was encouraged to push. She pushed for about 20 minutes.  During the last 3-4 contractions, fetal heart tones went into a bradycardia.  He maintained moderate variability during the deceleration.  They did intermittently recover between contractions.  Tried having the patient pushed on the first couple of contractions, and she was making good effort, but still needed more time.  During about the third contraction where the decelerations were occurring I counseled the patient and her partner regarding the use of the vacuum.  We discussed the risk, benefits, alternatives to the use of the vacuum as well as the risks of fetal birth trauma and fetal anoxic brain injury without the use of a vacuum over concerns about the fetal tracing.  Initially, the mother was hesitant, so we continued to have her push.  After the third contractions she was still was having fetal heart rate deceleration, so I again encouraged him to consider the use of the vacuum.  At this point they were both comfortable with the use of the vacuum and wished to proceed.  Patient was counseled about recommendation to proceed with vacuum-assisted delivery due to fetal bradycardia. Bladder was empty. Pt had adequate pain control. Fetal head was now at +3 station. There was no rotation necessary. Pt counseled extensively about risks of vacuum including fetal scalp injury, bruising, cephalohematoma, jaundice, skull fracture, intracranial bleed,  birth injury/trauma/shoulder dystocia. Also counseled that failure to successfully deliver after attempt with vacuum would require  delivery. Discussed alternatives such as continue maternal expulsive efforts, forceps, or  section. All questions answered and pt agreed with plan for  vacuum-assisted delivery. Verbal consent obtained. Vacuum applied roughly 2 centimeters anterior to the posterior fontanelle in the flexing median. At the start of contraction, vacuum pressure increased to roughly 550 mmHg in the “green zone”. Gentle assistance was given with the vacuum concurrent with maternal expulsive efforts. Advance in station was noted.  There the next contraction, she was able to easily deliver the baby with 2 pulls of the vacuum. Total application time was 20 seconds. There were no popoffs. . Head presented in OA presentation and restituted to right occiput transverse.  After restitution of the head to right occiput transverse, the baby then continue to rotate further in occiput posterior presentation.  At this point, the shoulders did not deliver.  I attempted gentle traction on the left anterior shoulder, but it did not deliver easily, so I notified everyone of the room of the shoulder dystocia.  Assistance was called from the outside.  Patient was placed in the Marg[place] position by her nurse, MAURICIO Harrison who was present by the patient's left hip and MAURICIO Fong who had entered the room for assistance, by the patient's right hip.  After about 10 seconds, the anterior shoulder still and delivered, so suprapubic pressure was called for and given by MAURICIO Fong.  With this, the left anterior fetal shoulder was freed, and then the remainder of the infant delivered easily.  Baby was immediately vigorous. Baby was placed on maternal abdomen and the cord was clamped after routine roughly 30 second delay. Cord blood was collected.  Cord gases were collected.  Placenta delivered spontaneously intact and 3-vessel cord noted. The vagina, cervix, perineum and rectum were inspected for lacerations, and a right labial laceration was noted. This was repaired in a typical fashion using 3-0 vicryl rapide. Counts for needles, sponges, laps, and instruments were correct x 2 at the completion. Vaginal  sweep and rectal exam perform. No sponges left in vagina. There were no complications. Mother and baby bonding well at the end of the delivery.  Inspection of the baby's head revealed no injury at the vacuum application site.  Baby was moving all extremities well.  Dr. Etienne was present and scrubbed for entire delivery.        Infant     Findings: female  infant     Infant observations: Weight: No birth weight on file.   Length:   in  Observations/Comments:  Ricky ld 4      Apgars: 7  @ 1 minute /    9  @ 5 minutes         Placenta & Cord         Placenta delivered  Spontaneous  at   2/5/2024  7:41 PM     Cord: 3 vessels  present.   Nuchal Cord?  no   Cord blood obtained: Yes    Cord gases obtained:  Yes    Cord gas results: Venous:  pH 7.24, PCO2 51, PO2 14.4, HCO3 22, BE: -5.5         Repair     Episiotomy: None     No    Lacerations: Yes  Laceration Information  Laceration Repaired?   Perineal: None      Periurethral:       Labial: right  Yes    Sulcus:       Vaginal:       Cervical:         Suture used for repair: 2-0 Vicryl Rapide     Estimated Blood Loss:       Quantitative Blood Loss: Quantitative Blood Loss (mL): 133 mL (02/05/24 2009)        Complications     Shoulder dystocia    Disposition     Mother to Mother Baby/Postpartum  in stable condition currently.  Baby to remains with mom  in stable condition currently.    Rock Etienne MD  02/05/24  20:17 EST

## 2024-02-06 NOTE — PROGRESS NOTES
Postpartum Progress Note      Status post Vaginal Delivery: Doing well postoperatively.     1) Postpartum care immediately following delivery :    Routine care, continue current care. Anticipate discharge home tomorrow.  used for this visit.    Hgb 11.2 --> 10.1  Rh status: A-, s/p rhogam  Syphilis screen in hospital: non-reactive  Rubella: Immune  Gender: female    Subjective     Postpartum Day 1: Vaginal delivery    The patient feels well. The patient denies emotional concerns. Pain is well controlled with current medications. The baby is well. The patient is ambulating well. The patient is tolerating a normal diet.     Objective     Vital signs in last 24 hours:  Temp:  [97.4 °F (36.3 °C)-99.2 °F (37.3 °C)] 98.4 °F (36.9 °C)  Heart Rate:  [] 73  Resp:  [16-18] 16  BP: ()/(37-92) 125/80      General:    alert, appears stated age, and cooperative   CV: RRR, no m/r/g   Lungs: CTAB   Abdomen:  Soft, Non-tender    Lochia:  appropriate   Uterine Fundus:   firm   Ext    Edema trace   DVT Evaluation:  No evidence of DVT seen on physical exam.     Lab Results   Component Value Date    WBC 13.49 (H) 02/06/2024    HGB 10.1 (L) 02/06/2024    HCT 29.4 (L) 02/06/2024    MCV 92.5 02/06/2024     02/06/2024       Princess Lux, APRN  2/6/2024  09:00 EST

## 2024-02-06 NOTE — PLAN OF CARE
Goal Outcome Evaluation:  Plan of Care Reviewed With: patient, significant other        Progress: improving  Outcome Evaluation: VSS.  Pt up ad alfredo and voiding without difficulty.  Fundal assessment and lochia, wnl.  Pain controlled w/prn ibuprofen.

## 2024-02-06 NOTE — PLAN OF CARE
Problem: Adult Inpatient Plan of Care  Goal: Plan of Care Review  Outcome: Ongoing, Progressing  Flowsheets  Taken 2/6/2024 1620 by Heather Wilcox RN  Outcome Evaluation: VSS, voiding spontaneously, fudal assessment and lochia WNL, pain controlled with ibuprofen PRN, bonding well with infant  Taken 2/6/2024 0540 by Jagjit Acosta RN  Progress: improving  Plan of Care Reviewed With:   patient   significant other  Goal: Patient-Specific Goal (Individualized)  Outcome: Ongoing, Progressing  Goal: Absence of Hospital-Acquired Illness or Injury  Outcome: Ongoing, Progressing  Intervention: Identify and Manage Fall Risk  Recent Flowsheet Documentation  Taken 2/6/2024 1419 by Heather Wilcox RN  Safety Promotion/Fall Prevention: safety round/check completed  Taken 2/6/2024 1342 by Heather Wilcox RN  Safety Promotion/Fall Prevention: safety round/check completed  Taken 2/6/2024 1245 by Heather Wilcox RN  Safety Promotion/Fall Prevention: safety round/check completed  Taken 2/6/2024 1116 by Heather Wilcox RN  Safety Promotion/Fall Prevention: safety round/check completed  Taken 2/6/2024 1041 by Heather Wilcox RN  Safety Promotion/Fall Prevention: safety round/check completed  Taken 2/6/2024 0855 by Heather Wilcox RN  Safety Promotion/Fall Prevention: safety round/check completed  Intervention: Prevent Skin Injury  Recent Flowsheet Documentation  Taken 2/6/2024 0855 by Heather Wilcox RN  Body Position: sitting up in bed  Intervention: Prevent and Manage VTE (Venous Thromboembolism) Risk  Recent Flowsheet Documentation  Taken 2/6/2024 0855 by Heather Wilcox RN  Activity Management: up ad alfrdeo  Goal: Optimal Comfort and Wellbeing  Outcome: Ongoing, Progressing  Intervention: Monitor Pain and Promote Comfort  Recent Flowsheet Documentation  Taken 2/6/2024 1554 by Heather Wilcox RN  Pain Management Interventions: see MAR  Taken 2/6/2024 0855 by Heather Wilcox RN  Pain Management Interventions: see MAR  Intervention:  Provide Person-Centered Care  Recent Flowsheet Documentation  Taken 2/6/2024 0855 by Heather Wilcox, RN  Trust Relationship/Rapport: care explained  Goal: Readiness for Transition of Care  Outcome: Ongoing, Progressing     Problem: Bleeding (Labor)  Goal: Hemostasis  Outcome: Ongoing, Progressing     Problem: Change in Fetal Wellbeing (Labor)  Goal: Stable Fetal Wellbeing  Outcome: Ongoing, Progressing  Intervention: Promote and Monitor Fetal Wellbeing  Recent Flowsheet Documentation  Taken 2/6/2024 0855 by Heather Wilcox, RN  Body Position: sitting up in bed     Problem: Delayed Labor Progression (Labor)  Goal: Effective Progression to Delivery  Outcome: Ongoing, Progressing     Problem: Infection (Labor)  Goal: Absence of Infection Signs and Symptoms  Outcome: Ongoing, Progressing     Problem: Labor Pain (Labor)  Goal: Acceptable Pain Control  Outcome: Ongoing, Progressing     Problem: Uterine Tachysystole (Labor)  Goal: Normal Uterine Contraction Pattern  Outcome: Ongoing, Progressing     Problem: Skin Injury Risk Increased  Goal: Skin Health and Integrity  Outcome: Ongoing, Progressing  Intervention: Optimize Skin Protection  Recent Flowsheet Documentation  Taken 2/6/2024 0855 by Heather Wilcox, RN  Head of Bed (HOB) Positioning: HOB at 60 degrees     Problem: Device-Related Complication Risk (Anesthesia/Analgesia, Neuraxial)  Goal: Safe Infusion Delivery Completion  Outcome: Ongoing, Progressing     Problem: Infection (Anesthesia/Analgesia, Neuraxial)  Goal: Absence of Infection Signs and Symptoms  Outcome: Ongoing, Progressing     Problem: Nausea and Vomiting (Anesthesia/Analgesia, Neuraxial)  Goal: Nausea and Vomiting Relief  Outcome: Ongoing, Progressing     Problem: Pain (Anesthesia/Analgesia, Neuraxial)  Goal: Effective Pain Control  Outcome: Ongoing, Progressing  Intervention: Prevent or Manage Pain  Recent Flowsheet Documentation  Taken 2/6/2024 1554 by Heather Wilcox, RN  Pain Management Interventions:  see MAR  Taken 2/6/2024 0855 by Heather Wilcox RN  Pain Management Interventions: see MAR     Problem: Respiratory Compromise (Anesthesia/Analgesia, Neuraxial)  Goal: Effective Oxygenation and Ventilation  Outcome: Ongoing, Progressing  Intervention: Optimize Oxygenation and Ventilation  Recent Flowsheet Documentation  Taken 2/6/2024 0855 by Heather Wilcox RN  Head of Bed (HOB) Positioning: HOB at 60 degrees     Problem: Sensorimotor Impairment (Anesthesia/Analgesia, Neuraxial)  Goal: Baseline Motor Function  Outcome: Ongoing, Progressing  Intervention: Optimize Sensorimotor Function  Recent Flowsheet Documentation  Taken 2/6/2024 1419 by Heather Wilcox RN  Safety Promotion/Fall Prevention: safety round/check completed  Taken 2/6/2024 1342 by Heather Wilcox RN  Safety Promotion/Fall Prevention: safety round/check completed  Taken 2/6/2024 1245 by Heather Wilcox RN  Safety Promotion/Fall Prevention: safety round/check completed  Taken 2/6/2024 1116 by Heather Wilcox RN  Safety Promotion/Fall Prevention: safety round/check completed  Taken 2/6/2024 1041 by Heather Wilcox RN  Safety Promotion/Fall Prevention: safety round/check completed  Taken 2/6/2024 0855 by Heather Wilcox RN  Safety Promotion/Fall Prevention: safety round/check completed     Problem: Urinary Retention (Anesthesia/Analgesia, Neuraxial)  Goal: Effective Urinary Elimination  Outcome: Ongoing, Progressing     Problem: Adjustment to Role Transition (Postpartum Vaginal Delivery)  Goal: Successful Maternal Role Transition  Outcome: Ongoing, Progressing     Problem: Bleeding (Postpartum Vaginal Delivery)  Goal: Hemostasis  Outcome: Ongoing, Progressing     Problem: Infection (Postpartum Vaginal Delivery)  Goal: Absence of Infection Signs/Symptoms  Outcome: Ongoing, Progressing  Intervention: Prevent or Manage Infection  Recent Flowsheet Documentation  Taken 2/6/2024 0855 by Heather Wilcox, RN  Perineal Care:   perineal spray bottle/warm water use  encouraged   perineal hygiene encouraged     Problem: Pain (Postpartum Vaginal Delivery)  Goal: Acceptable Pain Control  Outcome: Ongoing, Progressing  Intervention: Prevent or Manage Pain  Recent Flowsheet Documentation  Taken 2/6/2024 1554 by Heather Wilcox, RN  Pain Management Interventions: see MAR  Taken 2/6/2024 0855 by Heather Wilcox, RN  Pain Management Interventions: see MAR     Problem: Urinary Retention (Postpartum Vaginal Delivery)  Goal: Effective Urinary Elimination  Outcome: Ongoing, Progressing   Goal Outcome Evaluation:              Outcome Evaluation: VSS, voiding spontaneously, fudal assessment and lochia WNL, pain controlled with ibuprofen PRN, bonding well with infant

## 2024-02-07 VITALS
HEART RATE: 71 BPM | OXYGEN SATURATION: 96 % | RESPIRATION RATE: 16 BRPM | HEIGHT: 63 IN | TEMPERATURE: 97.6 F | BODY MASS INDEX: 24.63 KG/M2 | DIASTOLIC BLOOD PRESSURE: 68 MMHG | SYSTOLIC BLOOD PRESSURE: 113 MMHG | WEIGHT: 139 LBS

## 2024-02-07 PROBLEM — Z34.03 SUPERVISION OF NORMAL FIRST PREGNANCY IN THIRD TRIMESTER: Status: RESOLVED | Noted: 2024-02-04 | Resolved: 2024-02-07

## 2024-02-07 PROCEDURE — 0503F POSTPARTUM CARE VISIT: CPT

## 2024-02-07 RX ORDER — IBUPROFEN 600 MG/1
600 TABLET ORAL EVERY 6 HOURS PRN
Qty: 60 TABLET | Refills: 0 | Status: SHIPPED | OUTPATIENT
Start: 2024-02-07

## 2024-02-07 RX ORDER — PSEUDOEPHEDRINE HCL 30 MG
100 TABLET ORAL 2 TIMES DAILY
Qty: 60 CAPSULE | Refills: 0 | Status: SHIPPED | OUTPATIENT
Start: 2024-02-07

## 2024-02-07 RX ADMIN — Medication 1 TABLET: at 08:14

## 2024-02-07 RX ADMIN — DOCUSATE SODIUM 100 MG: 100 CAPSULE, LIQUID FILLED ORAL at 08:14

## 2024-02-07 RX ADMIN — IBUPROFEN 600 MG: 600 TABLET, FILM COATED ORAL at 08:23

## 2024-02-07 NOTE — PLAN OF CARE
Goal Outcome Evaluation:  Plan of Care Reviewed With: patient, spouse        Progress: improving  Outcome Evaluation: VSS, pain controlled, brestfeeding and bonding with infant

## 2024-02-07 NOTE — DISCHARGE SUMMARY
VAGINAL DELIVERY DISCHARGE SUMMARY      PATIENT: Rita Rivera        MR#:[id number]  LOCATION: [facility]  ADMISSION  DIAGNOSIS:    (spontaneous vaginal delivery)     DISCHARGE DIAGNOSIS:   1. Encounter for supervision of normal first pregnancy in third trimester          DATE OF ADMISSION: 2024  DATE OF DISCHARGE: 24     PROCEDURES:  Vaginal, Vacuum (Extractor)     2024    7:35 PM      SERVICE: Obstetrics    HOSPITAL COURSE: Rita underwent vaginal delivery of a female infant and remained in the hospital for 3 days. During that time, Rita remained afebrile and hemodynamically stable. On the day of discharge, Rita was eating, ambulating and voiding without difficulty.      LABS:   Lab Results   Component Value Date    WBC 13.49 (H) 2024    HGB 10.1 (L) 2024    HCT 29.4 (L) 2024    MCV 92.5 2024     2024    CREATININE 0.45 (L) 2024    AST 21 2024    ALT 20 2024     Results from last 7 days   Lab Units 243   ABO TYPING  A A   RH TYPING  Negative Negative   ANTIBODY SCREEN   --  Positive       DISCHARGE MEDICATIONS     Discharge Medications        New Medications        Instructions Start Date   docusate sodium 100 MG capsule   100 mg, Oral, 2 Times Daily      ibuprofen 600 MG tablet  Commonly known as: ADVIL,MOTRIN   600 mg, Oral, Every 6 Hours PRN             Continue These Medications        Instructions Start Date   ferrous sulfate 325 (65 FE) MG tablet   325 mg, Oral, Every Other Day      prenatal vitamin 28-0.8 28-0.8 MG tablet tablet   1 tablet, Oral, Daily               DISCHARGE DISPOSITION: Home    DISCHARGE CONDITION: Stable    DISCHARGE DIET: Regular    ACTIVITY AT DISCHARGE: Pelvic rest    INFANT FEEDING PLANS: Breast    EDUCATION: Warning signs and symptoms given, no tub baths, nothing in the vagina for 6 weeks.     FOLLOW-UP APPOINTMENTS: Follow up with [facility]  in 4 to 6 weeks  for routine postpartum visit.     Margoth Cross CNM  02/07/24  09:45 EST

## 2024-02-07 NOTE — PROGRESS NOTES
VAGINAL DELIVERY POSTPARTUM DAY 2    2024  PATIENT: Rita Rivera        MR#:[id number]  LOCATION: [facility]  DATE OF ADMISSION: 2024  ADMISSION  DIAGNOSIS:   Supervision of normal first pregnancy in third trimester     (spontaneous vaginal delivery)     CURRENT DIAGNOSIS: No notes have been filed under this hospital service.  Service: Hospitalist      SUBJECTIVE     Rita feels well.  Patient describes her lochia as less than menses.  Pain is well controlled.       OBJECTIVE   Temp: Temp:  [97.6 °F (36.4 °C)-97.9 °F (36.6 °C)] 97.6 °F (36.4 °C) Temp src: Oral   BP: BP: (107-119)/(66-72) 113/68        Pulse: Heart Rate:  [71-80] 71  RR: Resp:  [16-17] 16    General:  Awake, alert, no acute distress   Cardiac: Regular rate and rhythm    Respiratory: Lungs clear bilaterally, normal respiratory effort    Abdomen: Soft, non-distended, fundus firm, below umbilicus, appropriately tender   Pelvis: deferred   Extremities: Calves NT bilaterally, DTR 2+, no clonus noted, trace edema     Lab Results   Component Value Date    WBC 13.49 (H) 2024    HGB 10.1 (L) 2024    HCT 29.4 (L) 2024     2024    AST 21 2024    ALT 20 2024    CREATININE 0.45 (L) 2024       ASSESSMENT: Postpartum day 2 after vaginal delivery. Hgb: 10.1.    PLAN: Doing well. Discharge to home. Discharge instructions given, precautions reviewed. Follow up with [facility]  in 4 to 6 weeks for routine postpartum visit. Prescription for ibuprofen 600mg PO every 6 hours PRN for pain and docusate 100mg PO BID. Advised no tampons, menstrual cups, intercourse, or tub baths for 6 weeks.     Margoth Cross CNM  09:42 EST  2024

## 2024-03-26 ENCOUNTER — POSTPARTUM VISIT (OUTPATIENT)
Dept: OBSTETRICS AND GYNECOLOGY | Facility: CLINIC | Age: 24
End: 2024-03-26
Payer: COMMERCIAL

## 2024-03-26 VITALS
WEIGHT: 124.8 LBS | HEIGHT: 63 IN | SYSTOLIC BLOOD PRESSURE: 112 MMHG | DIASTOLIC BLOOD PRESSURE: 74 MMHG | BODY MASS INDEX: 22.11 KG/M2

## 2024-03-26 DIAGNOSIS — R63.0 DECREASE IN APPETITE: ICD-10-CM

## 2024-03-26 DIAGNOSIS — Z30.013 INITIATION OF DEPO PROVERA: ICD-10-CM

## 2024-03-26 DIAGNOSIS — N89.8 VAGINAL DISCHARGE: Primary | ICD-10-CM

## 2024-03-26 PROBLEM — Z87.59 HISTORY OF SHOULDER DYSTOCIA IN PRIOR PREGNANCY: Status: ACTIVE | Noted: 2024-03-26

## 2024-03-26 RX ORDER — PANTOPRAZOLE SODIUM 40 MG/1
1 TABLET, DELAYED RELEASE ORAL DAILY
COMMUNITY
Start: 2024-02-15

## 2024-03-26 RX ORDER — MEDROXYPROGESTERONE ACETATE 150 MG/ML
150 INJECTION, SUSPENSION INTRAMUSCULAR
Qty: 1 ML | Refills: 3 | Status: SHIPPED | OUTPATIENT
Start: 2024-03-26

## 2024-03-26 NOTE — PROGRESS NOTES
"Chief Complaint  Postpartum Care (Patient is here for 7 week postpartum (vaginal 2/5, patient states that she has no appetite and still seems to be nausea) )    Subjective        Rita [place] Nicole presents to [facility]  History of Present Illness  Patient is here for her postpartum visit.  She is 7 weeks out from a vaginal delivery.  She is breast-feeding but also supplementing with formula.  She reports that she still feels diminished appetite.  She also has some concerns about pressure in her right ear.  Patient also feels that subjectively the appearance of the vagina is abnormal or different after the delivery.  She and her partner have been sexually active since the delivery.  She has not yet for birth control but is interested in starting on Depo-Provera.    The following portions of the patient's history were reviewed and updated as appropriate: allergies, current medications, past family history, past medical history, past social history, past surgical history, and problem list.    Objective   Vital Signs:  /74   Ht 160 cm (62.99\")   Wt 56.6 kg (124 lb 12.8 oz)   BMI 22.11 kg/m²   Estimated body mass index is 22.11 kg/m² as calculated from the following:    Height as of this encounter: 160 cm (62.99\").    Weight as of this encounter: 56.6 kg (124 lb 12.8 oz).       BMI is within normal parameters. No other follow-up for BMI required.      Physical Exam   Gen: No acute distress, awake and oriented times three  Abdomen: soft, nontender, no masses or hernia, non distended, normoactive bowel sounds  Pelvic: Exam performed in the presence of a female chaperone  Patient has provided verbal consent to proceed with exam.  Normal external female genitalia, no lesions  Of note, there is no findings of prolapse noted.  No evidence of previous obstetrical laceration.  Appears fully healed.  Urethra: Normal meatus, no caruncle  Bladder: nontender  Vagina: No blood or " discharge  Cervix: No cervical motion tenderness, no lesions, no active bleeding, nonfriable  Uterus: Anteverted, normal size and shape, nontender  Adnexa: No masses or tenderness  External anal exam: Normal appearance, no lesions or hemorrhoids  Rectal: Deferred  Psych: Good judgement and insight, normal affect and mood      Result Review :                     Assessment and Plan     Diagnoses and all orders for this visit:    1. Postpartum exam (Primary)  -     HCG, B-subunit, Quantitative    Patient appears to have recovered well.  Exam is normal today.  Patient is concerned about the subject appearance of the external vulva/introitus of the vagina, but everything on exam appears normal.  Reassurance offered.  Explained that she is only 7 weeks postpartum, and over time things can return to normal.  Expected management recommended for the next few months to see if this improves spontaneously.    2. Initiation of Depo Provera  -     medroxyPROGESTERone (DEPO-PROVERA) 150 MG/ML injection; Inject 1 mL into the appropriate muscle as directed by prescriber Every 3 (Three) Months.  Dispense: 1 mL; Refill: 3    Various contraceptive options discussed including combined oral contraceptives, contraceptive patch, NuvaRing, progesterone only contraceptive, Depo-Provera, Nexplanon, progesterone IUD, copper IUD, and barrier methods. Paitent elects for depo provera. Risks, benefits, alternatives discussed at length.  Discussed typical side effects such as breakthrough bleeding, weight gain, and progesterone like side effects such as acne, facial hair growth, etc. patient is concerned about diminished appetite, and if anything Depo-Provera may help with this.  We have also discussed breakthrough bleeding and then ultimately frequency of amenorrhea or infrequent menses after 3 to 6 months using Depo-Provera.    3. Decrease in appetite  -     HCG, B-subunit, Quantitative  -     CBC Auto Differential  -     TSH Rfx On Abnormal To  Free T4    Check labs as above to evaluate for diminished appetite.  I also encouraged patient to follow-up with her PCP for evaluation of this as well as the right ear pressure that she has been experiencing.           Follow Up     Return PT NEEDS LABS TODAY; RN visit next avail for depo; annual exam 1 yr.  Patient was given instructions and counseling regarding her condition or for health maintenance advice. Please see specific information pulled into the AVS if appropriate.

## 2024-03-27 ENCOUNTER — TELEPHONE (OUTPATIENT)
Dept: OBSTETRICS AND GYNECOLOGY | Facility: CLINIC | Age: 24
End: 2024-03-27

## 2024-03-27 LAB
BASOPHILS # BLD AUTO: 0.04 10*3/MM3 (ref 0–0.2)
BASOPHILS NFR BLD AUTO: 0.6 % (ref 0–1.5)
EOSINOPHIL # BLD AUTO: 0.23 10*3/MM3 (ref 0–0.4)
EOSINOPHIL NFR BLD AUTO: 3.4 % (ref 0.3–6.2)
ERYTHROCYTE [DISTWIDTH] IN BLOOD BY AUTOMATED COUNT: 12.4 % (ref 12.3–15.4)
HCG INTACT+B SERPL-ACNC: <1 MIU/ML
HCT VFR BLD AUTO: 36.1 % (ref 34–46.6)
HGB BLD-MCNC: 12.2 G/DL (ref 12–15.9)
IMM GRANULOCYTES # BLD AUTO: 0.01 10*3/MM3 (ref 0–0.05)
IMM GRANULOCYTES NFR BLD AUTO: 0.1 % (ref 0–0.5)
LYMPHOCYTES # BLD AUTO: 2.06 10*3/MM3 (ref 0.7–3.1)
LYMPHOCYTES NFR BLD AUTO: 30.1 % (ref 19.6–45.3)
MCH RBC QN AUTO: 31 PG (ref 26.6–33)
MCHC RBC AUTO-ENTMCNC: 33.8 G/DL (ref 31.5–35.7)
MCV RBC AUTO: 91.6 FL (ref 79–97)
MONOCYTES # BLD AUTO: 0.71 10*3/MM3 (ref 0.1–0.9)
MONOCYTES NFR BLD AUTO: 10.4 % (ref 5–12)
NEUTROPHILS # BLD AUTO: 3.79 10*3/MM3 (ref 1.7–7)
NEUTROPHILS NFR BLD AUTO: 55.4 % (ref 42.7–76)
NRBC BLD AUTO-RTO: 0 /100 WBC (ref 0–0.2)
PLATELET # BLD AUTO: 371 10*3/MM3 (ref 140–450)
RBC # BLD AUTO: 3.94 10*6/MM3 (ref 3.77–5.28)
TSH SERPL DL<=0.005 MIU/L-ACNC: 1.88 UIU/ML (ref 0.27–4.2)
WBC # BLD AUTO: 6.84 10*3/MM3 (ref 3.4–10.8)

## 2024-03-27 NOTE — TELEPHONE ENCOUNTER
Caller: Rita Leary    Relationship to patient: Self    Best call back number: [phone]  [facility] TO [facility]    Chief complaint: WANTING TO SCHEDULE HER DEPO SHOT APPT.    Type of visit: DEPO SHOT APPT    Requested date: ASAP     If rescheduling, when is the original appointment: NA     Additional notes: NEEDED.

## 2024-03-28 LAB
A VAGINAE DNA VAG QL NAA+PROBE: NORMAL SCORE
BVAB2 DNA VAG QL NAA+PROBE: NORMAL SCORE
C ALBICANS DNA VAG QL NAA+PROBE: NEGATIVE
C GLABRATA DNA VAG QL NAA+PROBE: NEGATIVE
C TRACH DNA VAG QL NAA+PROBE: NEGATIVE
MEGA1 DNA VAG QL NAA+PROBE: NORMAL SCORE
N GONORRHOEA DNA VAG QL NAA+PROBE: NEGATIVE
T VAGINALIS DNA VAG QL NAA+PROBE: NEGATIVE

## 2024-04-01 ENCOUNTER — CLINICAL SUPPORT (OUTPATIENT)
Dept: OBSTETRICS AND GYNECOLOGY | Facility: CLINIC | Age: 24
End: 2024-04-01
Payer: COMMERCIAL

## 2024-04-01 VITALS
BODY MASS INDEX: 21.97 KG/M2 | HEIGHT: 63 IN | WEIGHT: 124 LBS | DIASTOLIC BLOOD PRESSURE: 60 MMHG | SYSTOLIC BLOOD PRESSURE: 100 MMHG

## 2024-04-01 DIAGNOSIS — Z30.013 ENCOUNTER FOR INITIAL PRESCRIPTION OF INJECTABLE CONTRACEPTIVE: Primary | ICD-10-CM

## 2024-04-01 LAB
B-HCG UR QL: NEGATIVE
EXPIRATION DATE: NORMAL
INTERNAL NEGATIVE CONTROL: NEGATIVE
INTERNAL POSITIVE CONTROL: POSITIVE
Lab: NORMAL

## 2024-04-01 PROCEDURE — 81025 URINE PREGNANCY TEST: CPT | Performed by: OBSTETRICS & GYNECOLOGY

## 2024-04-01 PROCEDURE — 96372 THER/PROPH/DIAG INJ SC/IM: CPT | Performed by: OBSTETRICS & GYNECOLOGY

## 2024-04-01 RX ORDER — MEDROXYPROGESTERONE ACETATE 150 MG/ML
150 INJECTION, SUSPENSION INTRAMUSCULAR ONCE
Status: COMPLETED | OUTPATIENT
Start: 2024-04-01 | End: 2024-04-01

## 2024-04-01 RX ADMIN — MEDROXYPROGESTERONE ACETATE 150 MG: 150 INJECTION, SUSPENSION INTRAMUSCULAR at 10:03

## 2024-04-01 NOTE — PROGRESS NOTES
Here today for depo provera injection   Tolerated right deltoid without a reaction  NDC [id number]  LOT [id number]  EXP 02/29/2027

## 2024-05-14 ENCOUNTER — TELEPHONE (OUTPATIENT)
Dept: OBSTETRICS AND GYNECOLOGY | Facility: CLINIC | Age: 24
End: 2024-05-14
Payer: COMMERCIAL

## 2024-05-14 NOTE — TELEPHONE ENCOUNTER
Please reassure the patient that breakthrough bleeding with Depo-Provera, especially during the first cycle of treatment is very common.  I think that the picture of the tissue that she showed is likely just some endometrial tissue which is shedding from the endometrium.    As long as the bleeding is not heavy, I think we can give some time to see if this resolves spontaneously.  She can be put on my schedule next week at [facility] on 5/22.  Please schedule her with an ultrasound that day as well.  If the bleeding resolves by then, the patient may opt to cancel the appointment if she wishes.